# Patient Record
Sex: FEMALE | HISPANIC OR LATINO | Employment: FULL TIME | URBAN - METROPOLITAN AREA
[De-identification: names, ages, dates, MRNs, and addresses within clinical notes are randomized per-mention and may not be internally consistent; named-entity substitution may affect disease eponyms.]

---

## 2017-01-05 ENCOUNTER — OFFICE VISIT (OUTPATIENT)
Dept: FAMILY MEDICINE | Facility: CLINIC | Age: 32
End: 2017-01-05
Payer: COMMERCIAL

## 2017-01-05 VITALS
BODY MASS INDEX: 22.49 KG/M2 | SYSTOLIC BLOOD PRESSURE: 100 MMHG | HEIGHT: 65 IN | TEMPERATURE: 98.1 F | WEIGHT: 135 LBS | DIASTOLIC BLOOD PRESSURE: 64 MMHG | HEART RATE: 70 BPM

## 2017-01-05 DIAGNOSIS — Z80.51 FAMILY HISTORY OF RENAL CELL CARCINOMA: ICD-10-CM

## 2017-01-05 DIAGNOSIS — Z78.9 IMMUNE TO VARICELLA: Primary | ICD-10-CM

## 2017-01-05 PROCEDURE — 86787 VARICELLA-ZOSTER ANTIBODY: CPT | Performed by: FAMILY MEDICINE

## 2017-01-05 PROCEDURE — 99000 SPECIMEN HANDLING OFFICE-LAB: CPT | Performed by: FAMILY MEDICINE

## 2017-01-05 PROCEDURE — 99202 OFFICE O/P NEW SF 15 MIN: CPT | Mod: 25 | Performed by: FAMILY MEDICINE

## 2017-01-05 PROCEDURE — 36415 COLL VENOUS BLD VENIPUNCTURE: CPT | Performed by: FAMILY MEDICINE

## 2017-01-05 NOTE — MR AVS SNAPSHOT
After Visit Summary   1/5/2017    Shellie Camarillo    MRN: [id number]           Patient Information     Date Of Birth          1985        Visit Information        Provider Department      1/5/2017 9:20 AM Andrew Ashley MD [facility]        Today's Diagnoses     Immune to varicella    -  1     Family history of renal cell carcinoma            Follow-ups after your visit        Your next 10 appointments already scheduled     Jan 06, 2017  3:15 PM   Nurse Only with EC NURSE   [facility] ([address] [address]   [phone]            Jan 09, 2017  9:00 AM   Nurse Only with EC NURSE   [facility] ([facility])    [address] [place] [address]   [phone]              Who to contact     If you have questions or need follow up information about today's clinic visit or your schedule please contact [facility] directly at [phone].  Normal or non-critical lab and imaging results will be communicated to you by [facility], letter or phone within 4 business days after the clinic has received the results. If you do not hear from us within 7 days, please contact the clinic through [employer] or phone. If you have a critical or abnormal lab result, we will notify you by phone as soon as possible.  Submit refill requests through [employer] or call your pharmacy and they will forward the refill request to us. Please allow 3 business days for your refill to be completed.          Additional Information About Your Visit        [facility] Information     [employer] gives you secure access to your electronic health record. If you see a primary care provider, you can also send messages to your care team and make appointments. If you have questions, please call your primary care clinic.  If you do not have a primary care provider, please call  "[phone] and they will assist you.        Care EveryWhere ID     This is your Care EveryWhere ID. This could be used by other organizations to access your [facility] medical records  [id number]        Your Vitals Were     Pulse Temperature Height BMI (Body Mass Index)          70 98.1  F (36.7  C) (Tympanic) 5' 5\" (1.651 m) 22.47 kg/m2         Blood Pressure from Last 3 Encounters:   01/05/17 100/64   07/15/15 108/70   07/14/15 114/80    Weight from Last 3 Encounters:   01/05/17 135 lb (61.236 kg)   07/15/15 126 lb (57.153 kg)   07/14/15 126 lb (57.153 kg)              We Performed the Following     Varicella Zoster Virus Antibody IgG        Primary Care Provider Office Phone # Fax #    Nate Shah [phone]       [facility]  [address] [place] [address]        Thank you!     Thank you for choosing [facility]  for your care. Our goal is always to provide you with excellent care. Hearing back from our patients is one way we can continue to improve our services. Please take a few minutes to complete the written survey that you may receive in the mail after your visit with us. Thank you!             Your Updated Medication List - Protect others around you: Learn how to safely use, store and throw away your medicines at www.disposemymeds.org.      Notice  As of 1/5/2017  9:26 AM    You have not been prescribed any medications.      "

## 2017-01-05 NOTE — PROGRESS NOTES
"  SUBJECTIVE:                                                    Shellie Camarillo is a 31 year old female who presents to clinic today for the following health issues:      New Patient/Transfer of Care      Problem list and histories reviewed & adjusted, as indicated.  Additional history: as documented    Patient Active Problem List   Diagnosis     CARDIOVASCULAR SCREENING; LDL GOAL LESS THAN 160     Encounter for Mirena IUD insertion 7/2015     Family history of renal cell carcinoma     Past Surgical History   Procedure Laterality Date     C nonspecific procedure  08/26/2005     Oral surgery     Cosmetic mammoplasty augmentation  10/2011       Social History   Substance Use Topics     Smoking status: Never Smoker      Smokeless tobacco: Never Used     Alcohol Use: Yes      Comment: socially     Family History   Problem Relation Age of Onset     DIABETES Paternal Grandmother      Family History Negative Mother      Family History Negative Father          No current outpatient prescriptions on file.     Allergies   Allergen Reactions     Penicillins      Promethazine      extra-pyramidal sx's     Recent Labs   Lab Test  10/18/11   1337   CR  0.74   GFRESTIMATED  >90   GFRESTBLACK  >90   POTASSIUM  4.0      BP Readings from Last 3 Encounters:   01/05/17 100/64   07/15/15 108/70   07/14/15 114/80    Wt Readings from Last 3 Encounters:   01/05/17 135 lb (61.236 kg)   07/15/15 126 lb (57.153 kg)   07/14/15 126 lb (57.153 kg)                    ROS:  Constitutional, HEENT, cardiovascular, pulmonary, gi and gu systems are negative, except as otherwise noted.    OBJECTIVE:                                                    /64 mmHg  Pulse 70  Temp(Src) 98.1  F (36.7  C) (Tympanic)  Ht 5' 5\" (1.651 m)  Wt 135 lb (61.236 kg)  BMI 22.47 kg/m2  Body mass index is 22.47 kg/(m^2).  GENERAL: healthy, alert and no distress  NECK: no adenopathy, no asymmetry, masses, or scars and thyroid normal to palpation  RESP: lungs " clear to auscultation - no rales, rhonchi or wheezes  CV: regular rate and rhythm, normal S1 S2, no S3 or S4, no murmur, click or rub, no peripheral edema and peripheral pulses strong  ABDOMEN: soft, nontender, no hepatosplenomegaly, no masses and bowel sounds normal  MS: no gross musculoskeletal defects noted, no edema         ASSESSMENT/PLAN:                                                    Shellie was seen today for establish care.    Diagnoses and all orders for this visit:    Immune to varicella  -     Varicella Zoster Virus Antibody IgG    Family history of renal cell carcinoma      Need varicella screening test for school, will have her to check lab, and also will have her to do PPD for it as well     Andrew Ashley MD  [facility]

## 2017-01-05 NOTE — NURSING NOTE
"Chief Complaint   Patient presents with     Establish Care     needs vaccine       Initial /64 mmHg  Pulse 70  Temp(Src) 98.1  F (36.7  C) (Tympanic)  Ht 5' 5\" (1.651 m)  Wt 135 lb (61.236 kg)  BMI 22.47 kg/m2 Estimated body mass index is 22.47 kg/(m^2) as calculated from the following:    Height as of this encounter: 5' 5\" (1.651 m).    Weight as of this encounter: 135 lb (61.236 kg).  BP completed using cuff size: seun Shen CMA    "

## 2017-01-06 ENCOUNTER — ALLIED HEALTH/NURSE VISIT (OUTPATIENT)
Dept: NURSING | Facility: CLINIC | Age: 32
End: 2017-01-06
Payer: COMMERCIAL

## 2017-01-06 DIAGNOSIS — Z11.1 SCREENING EXAMINATION FOR PULMONARY TUBERCULOSIS: Primary | ICD-10-CM

## 2017-01-06 LAB — VZV IGG SER QL IA: 2.3 AI (ref 0–0.8)

## 2017-01-06 PROCEDURE — 86580 TB INTRADERMAL TEST: CPT

## 2017-01-09 ENCOUNTER — ALLIED HEALTH/NURSE VISIT (OUTPATIENT)
Dept: NURSING | Facility: CLINIC | Age: 32
End: 2017-01-09
Payer: COMMERCIAL

## 2017-01-09 DIAGNOSIS — Z11.1 SCREENING EXAMINATION FOR PULMONARY TUBERCULOSIS: Primary | ICD-10-CM

## 2017-01-09 LAB
PPDINDURATION: 0 MM (ref 0–5)
PPDREDNESS: 0 MM

## 2017-01-09 PROCEDURE — 99207 ZZC NO CHARGE NURSE ONLY: CPT

## 2017-01-19 ENCOUNTER — OFFICE VISIT (OUTPATIENT)
Dept: FAMILY MEDICINE | Facility: CLINIC | Age: 32
End: 2017-01-19
Payer: COMMERCIAL

## 2017-01-19 VITALS
HEIGHT: 65 IN | SYSTOLIC BLOOD PRESSURE: 100 MMHG | WEIGHT: 136 LBS | OXYGEN SATURATION: 100 % | TEMPERATURE: 99.1 F | HEART RATE: 63 BPM | BODY MASS INDEX: 22.66 KG/M2 | DIASTOLIC BLOOD PRESSURE: 70 MMHG

## 2017-01-19 DIAGNOSIS — H10.021 PINK EYE, RIGHT: ICD-10-CM

## 2017-01-19 DIAGNOSIS — J01.90 ACUTE SINUSITIS WITH SYMPTOMS > 10 DAYS: ICD-10-CM

## 2017-01-19 DIAGNOSIS — R07.0 THROAT PAIN: Primary | ICD-10-CM

## 2017-01-19 LAB
DEPRECATED S PYO AG THROAT QL EIA: NORMAL
MICRO REPORT STATUS: NORMAL
SPECIMEN SOURCE: NORMAL

## 2017-01-19 PROCEDURE — 87880 STREP A ASSAY W/OPTIC: CPT | Performed by: FAMILY MEDICINE

## 2017-01-19 PROCEDURE — 99213 OFFICE O/P EST LOW 20 MIN: CPT | Performed by: FAMILY MEDICINE

## 2017-01-19 PROCEDURE — 87081 CULTURE SCREEN ONLY: CPT | Performed by: FAMILY MEDICINE

## 2017-01-19 RX ORDER — AZITHROMYCIN 250 MG/1
TABLET, FILM COATED ORAL
Qty: 6 TABLET | Refills: 0 | Status: SHIPPED | OUTPATIENT
Start: 2017-01-19 | End: 2018-09-18

## 2017-01-19 RX ORDER — POLYMYXIN B SULFATE AND TRIMETHOPRIM 1; 10000 MG/ML; [USP'U]/ML
1 SOLUTION OPHTHALMIC EVERY 4 HOURS
Qty: 1 BOTTLE | Refills: 0 | Status: SHIPPED | OUTPATIENT
Start: 2017-01-19 | End: 2017-01-26

## 2017-01-19 NOTE — NURSING NOTE
"Chief Complaint   Patient presents with     Pharyngitis     Eye Problem       Initial /70 mmHg  Pulse 63  Temp(Src) 99.1  F (37.3  C) (Tympanic)  Ht 5' 5\" (1.651 m)  Wt 136 lb (61.689 kg)  BMI 22.63 kg/m2  SpO2 100% Estimated body mass index is 22.63 kg/(m^2) as calculated from the following:    Height as of this encounter: 5' 5\" (1.651 m).    Weight as of this encounter: 136 lb (61.689 kg).  BP completed using cuff size: juan pablo Shen CMA    "

## 2017-01-19 NOTE — PROGRESS NOTES
SUBJECTIVE:                                                    Shellie Camarillo is a 31 year old female who presents to clinic today for the following health issues:      Acute Illness   Acute illness concerns: uri  Onset: x 5 days     Fever: YES    Chills/Sweats: no    Headache (location?): YES    Sinus Pressure:YES    Conjunctivitis:  no    Ear Pain: no    Rhinorrhea: YES    Congestion: YES    Sore Throat: YES     Cough: YES    Wheeze: no    Decreased Appetite: no    Nausea: no    Vomiting: no    Diarrhea:  no    Dysuria/Freq.: no    Fatigue/Achiness: YES    Sick/Strep Exposure: no     Therapies Tried and outcome: OTC meds      Problem list and histories reviewed & adjusted, as indicated.  Additional history: as documented    Patient Active Problem List   Diagnosis     CARDIOVASCULAR SCREENING; LDL GOAL LESS THAN 160     Encounter for Mirena IUD insertion 7/2015     Family history of renal cell carcinoma     Past Surgical History   Procedure Laterality Date     C nonspecific procedure  08/26/2005     Oral surgery     Cosmetic mammoplasty augmentation  10/2011       Social History   Substance Use Topics     Smoking status: Never Smoker      Smokeless tobacco: Never Used     Alcohol Use: Yes      Comment: socially     Family History   Problem Relation Age of Onset     DIABETES Paternal Grandmother      Family History Negative Mother      Family History Negative Father          No current outpatient prescriptions on file.     Allergies   Allergen Reactions     Penicillins      Promethazine      extra-pyramidal sx's     Recent Labs   Lab Test  10/18/11   1337   CR  0.74   GFRESTIMATED  >90   GFRESTBLACK  >90   POTASSIUM  4.0      BP Readings from Last 3 Encounters:   01/19/17 100/70   01/05/17 100/64   07/15/15 108/70    Wt Readings from Last 3 Encounters:   01/19/17 136 lb (61.689 kg)   01/05/17 135 lb (61.236 kg)   07/15/15 126 lb (57.153 kg)                    ROS:  Constitutional, HEENT, cardiovascular,  "pulmonary, gi and gu systems are negative, except as otherwise noted.    OBJECTIVE:                                                    /70 mmHg  Pulse 63  Temp(Src) 99.1  F (37.3  C) (Tympanic)  Ht 5' 5\" (1.651 m)  Wt 136 lb (61.689 kg)  BMI 22.63 kg/m2  SpO2 100%  Body mass index is 22.63 kg/(m^2).  GENERAL: healthy, alert and no distress  EYES: conjunctiva/corneas- conjunctival injection OD  HENT: normal cephalic/atraumatic, ear canals and TM's normal, nasal mucosa edematous , rhinorrhea green and purulent postnasal drainage   NECK: no adenopathy, no asymmetry, masses, or scars and thyroid normal to palpation  RESP: lungs clear to auscultation - no rales, rhonchi or wheezes  CV: regular rate and rhythm, normal S1 S2, no S3 or S4, no murmur, click or rub, no peripheral edema and peripheral pulses strong  ABDOMEN: soft, nontender, no hepatosplenomegaly, no masses and bowel sounds normal  MS: no gross musculoskeletal defects noted, no edema         ASSESSMENT/PLAN:                                                    Shellie was seen today for pharyngitis and eye problem.    Diagnoses and all orders for this visit:    Throat pain  -     Strep, Rapid Screen  -     Beta strep group A culture    Pink eye, right  -     trimethoprim-polymyxin b (POLYTRIM) ophthalmic solution; Apply 1 drop to eye every 4 hours for 7 days    Acute sinusitis with symptoms > 10 days  -     azithromycin (ZITHROMAX) 250 MG tablet; Two tablets first day, then one tablet daily for four days.          Andrew Ashley MD  [facility]    "

## 2017-01-21 LAB
BACTERIA SPEC CULT: NORMAL
MICRO REPORT STATUS: NORMAL
SPECIMEN SOURCE: NORMAL

## 2017-01-23 ENCOUNTER — ALLIED HEALTH/NURSE VISIT (OUTPATIENT)
Dept: NURSING | Facility: CLINIC | Age: 32
End: 2017-01-23
Payer: COMMERCIAL

## 2017-01-23 DIAGNOSIS — Z11.1 SCREENING EXAMINATION FOR PULMONARY TUBERCULOSIS: Primary | ICD-10-CM

## 2017-01-23 PROCEDURE — 86580 TB INTRADERMAL TEST: CPT

## 2017-01-23 NOTE — NURSING NOTE
Patient here for nurse only. Needs mantoux for school/clinicals. Second part of 2 step mantoux, plans to return Wed. For reading. GONZALO Booth LPN

## 2017-01-25 ENCOUNTER — ALLIED HEALTH/NURSE VISIT (OUTPATIENT)
Dept: NURSING | Facility: CLINIC | Age: 32
End: 2017-01-25
Payer: COMMERCIAL

## 2017-01-25 DIAGNOSIS — Z11.1 VISIT FOR MANTOUX TEST: Primary | ICD-10-CM

## 2017-01-25 LAB
PPDINDURATION: NORMAL MM (ref 0–5)
PPDREDNESS: NORMAL MM

## 2017-01-25 PROCEDURE — 99207 ZZC NO CHARGE NURSE ONLY: CPT

## 2018-09-18 ENCOUNTER — OFFICE VISIT (OUTPATIENT)
Dept: FAMILY MEDICINE | Facility: CLINIC | Age: 33
End: 2018-09-18
Payer: COMMERCIAL

## 2018-09-18 VITALS
TEMPERATURE: 98.1 F | HEART RATE: 59 BPM | HEIGHT: 65 IN | OXYGEN SATURATION: 99 % | WEIGHT: 147 LBS | DIASTOLIC BLOOD PRESSURE: 64 MMHG | SYSTOLIC BLOOD PRESSURE: 122 MMHG | BODY MASS INDEX: 24.49 KG/M2

## 2018-09-18 DIAGNOSIS — Z80.51 FAMILY HISTORY OF RENAL CELL CARCINOMA: ICD-10-CM

## 2018-09-18 DIAGNOSIS — Z00.00 ROUTINE GENERAL MEDICAL EXAMINATION AT A HEALTH CARE FACILITY: Primary | ICD-10-CM

## 2018-09-18 DIAGNOSIS — R53.82 CHRONIC FATIGUE: ICD-10-CM

## 2018-09-18 LAB
DEPRECATED CALCIDIOL+CALCIFEROL SERPL-MC: 46 UG/L (ref 20–75)
ERYTHROCYTE [DISTWIDTH] IN BLOOD BY AUTOMATED COUNT: 12.1 % (ref 10–15)
HCT VFR BLD AUTO: 37.3 % (ref 35–47)
HGB BLD-MCNC: 12.3 G/DL (ref 11.7–15.7)
MCH RBC QN AUTO: 28.9 PG (ref 26.5–33)
MCHC RBC AUTO-ENTMCNC: 33 G/DL (ref 31.5–36.5)
MCV RBC AUTO: 88 FL (ref 78–100)
PLATELET # BLD AUTO: 216 10E9/L (ref 150–450)
RBC # BLD AUTO: 4.25 10E12/L (ref 3.8–5.2)
WBC # BLD AUTO: 4.1 10E9/L (ref 4–11)

## 2018-09-18 PROCEDURE — 82306 VITAMIN D 25 HYDROXY: CPT | Performed by: FAMILY MEDICINE

## 2018-09-18 PROCEDURE — 80053 COMPREHEN METABOLIC PANEL: CPT | Performed by: FAMILY MEDICINE

## 2018-09-18 PROCEDURE — 36415 COLL VENOUS BLD VENIPUNCTURE: CPT | Performed by: FAMILY MEDICINE

## 2018-09-18 PROCEDURE — 99395 PREV VISIT EST AGE 18-39: CPT | Performed by: FAMILY MEDICINE

## 2018-09-18 PROCEDURE — 84443 ASSAY THYROID STIM HORMONE: CPT | Performed by: FAMILY MEDICINE

## 2018-09-18 PROCEDURE — 99213 OFFICE O/P EST LOW 20 MIN: CPT | Mod: 25 | Performed by: FAMILY MEDICINE

## 2018-09-18 PROCEDURE — 85027 COMPLETE CBC AUTOMATED: CPT | Performed by: FAMILY MEDICINE

## 2018-09-18 RX ORDER — NORGESTIMATE AND ETHINYL ESTRADIOL 0.25-0.035
KIT ORAL
COMMUNITY
Start: 2018-08-29 | End: 2020-11-05

## 2018-09-18 NOTE — PROGRESS NOTES
SUBJECTIVE:   CC: Shellie Nino is an 33 year old woman who presents for preventive health visit.     Healthy Habits:    Do you get at least three servings of calcium containing foods daily (dairy, green leafy vegetables, etc.)? yes    Amount of exercise or daily activities, outside of work: 2 day(s) per week    Problems taking medications regularly No    Medication side effects: No    Have you had an eye exam in the past two years? yes    Do you see a dentist twice per year? yes    Do you have sleep apnea, excessive snoring or daytime drowsiness?no    Patient complains of chronic fatigue.  Symptoms progressively worsening.  Denies any depression or anxiety.  She sleeps well.    Today's PHQ-2 Score:   PHQ-2 ( 1999 Pf[employer]) 9/18/2018 1/19/2017   Q1: Little interest or pleasure in doing things 0 0   Q2: Feeling down, depressed or hopeless 0 0   PHQ-2 Score 0 0       Abuse: Current or Past(Physical, Sexual or Emotional)- No  Do you feel safe in your environment - Yes    Social History   Substance Use Topics     Smoking status: Never Smoker     Smokeless tobacco: Never Used     Alcohol use Yes      Comment: socially     If you drink alcohol do you typically have >3 drinks per day or >7 drinks per week? No                     Reviewed orders with patient.  Reviewed health maintenance and updated orders accordingly - Yes  Labs reviewed in EPIC  BP Readings from Last 3 Encounters:   09/18/18 122/64   01/19/17 100/70   01/05/17 100/64    Wt Readings from Last 3 Encounters:   09/18/18 147 lb (66.7 kg)   01/19/17 136 lb (61.7 kg)   01/05/17 135 lb (61.2 kg)                  Patient Active Problem List   Diagnosis     Family history of renal cell carcinoma     Past Surgical History:   Procedure Laterality Date     C NONSPECIFIC PROCEDURE  08/26/2005    Oral surgery     COSMETIC MAMMOPLASTY AUGMENTATION  10/2011       Social History   Substance Use Topics     Smoking status: Never Smoker     Smokeless tobacco: Never  "Used     Alcohol use Yes      Comment: socially     Family History   Problem Relation Age of Onset     Kidney Cancer Mother      Hypertension Father      Diabetes Paternal Grandmother          Current Outpatient Prescriptions   Medication Sig Dispense Refill     norgestimate-ethinyl estradiol (ORTHO-CYCLEN, SPRINTEC) 0.25-35 MG-MCG per tablet        Allergies   Allergen Reactions     Penicillins      Promethazine      extra-pyramidal sx's       Mammogram not appropriate for this patient based on age.    Pertinent mammograms are reviewed under the imaging tab.  History of abnormal Pap smear: NO - age 30- 65 PAP every 3 years recommended  PAP / HPV 8/7/2014 11/17/2011 8/26/2010   PAP NIL NIL NIL     Reviewed and updated as needed this visit by clinical staff  Tobacco  Allergies  Meds  Problems  Med Hx  Surg Hx  Fam Hx  Soc Hx          Reviewed and updated as needed this visit by Provider  Allergies  Meds  Problems            ROS:  CONSTITUTIONAL: NEGATIVE for fever, chills, change in weight  INTEGUMENTARU/SKIN: NEGATIVE for worrisome rashes, moles or lesions  EYES: NEGATIVE for vision changes or irritation  ENT: NEGATIVE for ear, mouth and throat problems  RESP: NEGATIVE for significant cough or SOB  BREAST: NEGATIVE for masses, tenderness or discharge  CV: NEGATIVE for chest pain, palpitations or peripheral edema  GI: NEGATIVE for nausea, abdominal pain, heartburn, or change in bowel habits  : NEGATIVE for unusual urinary or vaginal symptoms. Periods are regular.  MUSCULOSKELETAL: NEGATIVE for significant arthralgias or myalgia  NEURO: NEGATIVE for weakness, dizziness or paresthesias  PSYCHIATRIC: NEGATIVE for changes in mood or affect    OBJECTIVE:   /64  Pulse 59  Temp 98.1  F (36.7  C) (Tympanic)  Ht 5' 5.04\" (1.652 m)  Wt 147 lb (66.7 kg)  SpO2 99%  BMI 24.43 kg/m2  EXAM:  GENERAL: healthy, alert and no distress  EYES: Eyes grossly normal to inspection, PERRL and conjunctivae and sclerae " "normal  HENT: ear canals and TM's normal, nose and mouth without ulcers or lesions  NECK: no adenopathy, no asymmetry, masses, or scars and thyroid normal to palpation  RESP: lungs clear to auscultation - no rales, rhonchi or wheezes  CV: regular rate and rhythm, normal S1 S2, no S3 or S4, no murmur, click or rub, no peripheral edema and peripheral pulses strong  ABDOMEN: soft, nontender, no hepatosplenomegaly, no masses and bowel sounds normal  MS: no gross musculoskeletal defects noted, no edema  SKIN: no suspicious lesions or rashes  NEURO: Normal strength and tone, mentation intact and speech normal  PSYCH: mentation appears normal, affect normal/bright          ASSESSMENT/PLAN:   1. Routine general medical examination at a health care facility    2. Chronic fatigue  Questionable etiology.  Recommended the workup as below.  Recommended to sleep well, stay hydrated.  Eat a nutritious diet and exercise regularly.  - Vitamin D Deficiency  - TSH with free T4 reflex  - CBC with platelets  - Comprehensive metabolic panel    3. Family history of renal cell carcinoma  Recommended to get a urinalysis checked once a year as her mother had renal cancer.  - UA reflex to Microscopic; Future    COUNSELING:   Reviewed preventive health counseling, as reflected in patient instructions       Regular exercise       Healthy diet/nutrition    BP Readings from Last 1 Encounters:   09/18/18 122/64     Estimated body mass index is 24.43 kg/(m^2) as calculated from the following:    Height as of this encounter: 5' 5.04\" (1.652 m).    Weight as of this encounter: 147 lb (66.7 kg).           reports that she has never smoked. She has never used smokeless tobacco.      Counseling Resources:  ATP IV Guidelines  Pooled Cohorts Equation Calculator  Breast Cancer Risk Calculator  FRAX Risk Assessment  ICSI Preventive Guidelines  Dietary Guidelines for Americans, 2010  USDA's MyPlate  ASA Prophylaxis  Lung CA Screening    Nicole Beach, " MD  [facility] [place]

## 2018-09-18 NOTE — MR AVS SNAPSHOT
After Visit Summary   9/18/2018    Shellie Nion    MRN: [id number]           Patient Information     Date Of Birth          1985        Visit Information        Provider Department      9/18/2018 9:00 AM Nicole Beach MD [facility]        Today's Diagnoses     Routine general medical examination at a health care facility    -  1    Chronic fatigue        Family history of renal cell carcinoma          Care Instructions      Preventive Health Recommendations  Female Ages 26 - 39  Yearly exam:   See your health care provider every year in order to    Review health changes.     Discuss preventive care.      Review your medicines if you your doctor has prescribed any.    Until age 30: Get a Pap test every three years (more often if you have had an abnormal result).    After age 30: Talk to your doctor about whether you should have a Pap test every 3 years or have a Pap test with HPV screening every 5 years.   You do not need a Pap test if your uterus was removed (hysterectomy) and you have not had cancer.  You should be tested each year for STDs (sexually transmitted diseases), if you're at risk.   Talk to your provider about how often to have your cholesterol checked.  If you are at risk for diabetes, you should have a diabetes test (fasting glucose).  Shots: Get a flu shot each year. Get a tetanus shot every 10 years.   Nutrition:     Eat at least 5 servings of fruits and vegetables each day.    Eat whole-grain bread, whole-wheat pasta and brown rice instead of white grains and rice.    Get adequate Calcium and Vitamin D.     Lifestyle    Exercise at least 150 minutes a week (30 minutes a day, 5 days of the week). This will help you control your weight and prevent disease.    Limit alcohol to one drink per day.    No smoking.     Wear sunscreen to prevent skin cancer.    See your dentist every six months for an exam and cleaning.            Follow-ups after your visit    "     Follow-up notes from your care team     Return in about 1 year (around 9/18/2019) for Annual Physical Exam.      Who to contact     If you have questions or need follow up information about today's clinic visit or your schedule please contact [facility] [place] directly at [phone].  Normal or non-critical lab and imaging results will be communicated to you by MyChart, letter or phone within 4 business days after the clinic has received the results. If you do not hear from us within 7 days, please contact the clinic through [employer] or phone. If you have a critical or abnormal lab result, we will notify you by phone as soon as possible.  Submit refill requests through [employer] or call your pharmacy and they will forward the refill request to us. Please allow 3 business days for your refill to be completed.          Additional Information About Your Visit        [employer] Information     [employer] gives you secure access to your electronic health record. If you see a primary care provider, you can also send messages to your care team and make appointments. If you have questions, please call your primary care clinic.  If you do not have a primary care provider, please call [phone] and they will assist you.        Care EveryWhere ID     This is your Care EveryWhere ID. This could be used by other organizations to access your [facility] medical records  [id number]        Your Vitals Were     Pulse Temperature Height Pulse Oximetry BMI (Body Mass Index)       59 98.1  F (36.7  C) (Tympanic) 5' 5.04\" (1.652 m) 99% 24.43 kg/m2        Blood Pressure from Last 3 Encounters:   09/18/18 122/64   01/19/17 100/70   01/05/17 100/64    Weight from Last 3 Encounters:   09/18/18 147 lb (66.7 kg)   01/19/17 136 lb (61.7 kg)   01/05/17 135 lb (61.2 kg)              We Performed the Following     CBC with platelets     Comprehensive metabolic panel     TSH with free T4 reflex     UA reflex to Microscopic     Vitamin " D Deficiency        Primary Care Provider Office Phone # Fax #    Andrew IDANIA Ashley [phone]        [facility] [address]        Equal Access to Services     CONNOR BENDER : Hadii aad ku hadstanfordo Soomaali, waaxda luqadaha, qaybta kaalmada adeegyada, april lawosnin hayaashannan bouchertito turcios princess adam. So [facility] [phone].    ATENCIÓN: Si habla español, tiene a stafford disposición servicios gratuitos de asistencia lingüística. Llame al [phone].    We comply with applicable federal civil rights laws and [place] laws. We do not discriminate on the basis of race, color, national origin, age, disability, sex, sexual orientation, or gender identity.            Thank you!     Thank you for choosing [facility]  for your care. Our goal is always to provide you with excellent care. Hearing back from our patients is one way we can continue to improve our services. Please take a few minutes to complete the written survey that you may receive in the mail after your visit with us. Thank you!             Your Updated Medication List - Protect others around you: Learn how to safely use, store and throw away your medicines at www.disposemymeds.org.          This list is accurate as of 9/18/18  9:16 AM.  Always use your most recent med list.                   Brand Name Dispense Instructions for use Diagnosis    norgestimate-ethinyl estradiol 0.25-35 MG-MCG per tablet    ORTHO-CYCLEN, SPRINTEC

## 2018-09-19 LAB
ALBUMIN SERPL-MCNC: 3.6 G/DL (ref 3.4–5)
ALP SERPL-CCNC: 54 U/L (ref 40–150)
ALT SERPL W P-5'-P-CCNC: 18 U/L (ref 0–50)
ANION GAP SERPL CALCULATED.3IONS-SCNC: 8 MMOL/L (ref 3–14)
AST SERPL W P-5'-P-CCNC: 17 U/L (ref 0–45)
BILIRUB SERPL-MCNC: 0.4 MG/DL (ref 0.2–1.3)
BUN SERPL-MCNC: 7 MG/DL (ref 7–30)
CALCIUM SERPL-MCNC: 8.8 MG/DL (ref 8.5–10.1)
CHLORIDE SERPL-SCNC: 107 MMOL/L (ref 94–109)
CO2 SERPL-SCNC: 28 MMOL/L (ref 20–32)
CREAT SERPL-MCNC: 0.67 MG/DL (ref 0.52–1.04)
GFR SERPL CREATININE-BSD FRML MDRD: >90 ML/MIN/1.7M2
GLUCOSE SERPL-MCNC: 78 MG/DL (ref 70–99)
POTASSIUM SERPL-SCNC: 4.1 MMOL/L (ref 3.4–5.3)
PROT SERPL-MCNC: 7.2 G/DL (ref 6.8–8.8)
SODIUM SERPL-SCNC: 143 MMOL/L (ref 133–144)
TSH SERPL DL<=0.005 MIU/L-ACNC: 1.15 MU/L (ref 0.4–4)

## 2019-01-01 ENCOUNTER — TRANSFERRED RECORDS (OUTPATIENT)
Dept: HEALTH INFORMATION MANAGEMENT | Facility: CLINIC | Age: 34
End: 2019-01-01

## 2019-01-01 LAB — PAP SMEAR - HIM PATIENT REPORTED: NEGATIVE

## 2020-02-17 ENCOUNTER — HEALTH MAINTENANCE LETTER (OUTPATIENT)
Age: 35
End: 2020-02-17

## 2020-06-08 ENCOUNTER — MYC MEDICAL ADVICE (OUTPATIENT)
Dept: FAMILY MEDICINE | Facility: CLINIC | Age: 35
End: 2020-06-08

## 2020-06-08 ENCOUNTER — VIRTUAL VISIT (OUTPATIENT)
Dept: FAMILY MEDICINE | Facility: CLINIC | Age: 35
End: 2020-06-08
Payer: COMMERCIAL

## 2020-06-08 DIAGNOSIS — Z20.822 COVID-19 RULED OUT: Primary | ICD-10-CM

## 2020-06-08 PROCEDURE — 99213 OFFICE O/P EST LOW 20 MIN: CPT | Mod: TEL | Performed by: FAMILY MEDICINE

## 2020-06-08 NOTE — Clinical Note
Can you please call her to find her email address? I wanted to send email with link page about [facility] info, but her email address I got earlier today not working...  thx

## 2020-06-08 NOTE — PROGRESS NOTES
"Shellie Nino is a 34 year old female who is being evaluated via a billable telephone visit.      The patient has been notified of following:     \"This telephone visit will be conducted via a call between you and your physician/provider. We have found that certain health care needs can be provided without the need for a physical exam.  This service lets us provide the care you need with a short phone conversation.  If a prescription is necessary we can send it directly to your pharmacy.  If lab work is needed we can place an order for that and you can then stop by our lab to have the test done at a later time.    Telephone visits are billed at different rates depending on your insurance coverage. During this emergency period, for some insurers they may be billed the same as an in-person visit.  Please reach out to your insurance provider with any questions.    If during the course of the call the physician/provider feels a telephone visit is not appropriate, you will not be charged for this service.\"    Patient has given verbal consent for Telephone visit?  Yes    What phone number would you like to be contacted at? [phone]    How would you like to obtain your AVS? 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  She took part at the protest this past week           Patient Active Problem List   Diagnosis     Family history of renal cell carcinoma     Past Surgical History:   Procedure Laterality Date     C NONSPECIFIC PROCEDURE  08/26/2005    Oral surgery     COSMETIC MAMMOPLASTY AUGMENTATION  10/2011       Social History     Tobacco Use     Smoking status: Never Smoker     Smokeless tobacco: Never Used   Substance Use Topics     Alcohol use: Yes     Comment: socially     " Family History   Problem Relation Age of Onset     Kidney Cancer Mother      Hypertension Father      Diabetes Paternal Grandmother          Current Outpatient Medications   Medication Sig Dispense Refill     norgestimate-ethinyl estradiol (ORTHO-CYCLEN, SPRINTEC) 0.25-35 MG-MCG per tablet        Allergies   Allergen Reactions     Penicillins      Promethazine      extra-pyramidal sx's     Recent Labs   Lab Test 09/18/18  0922   ALT 18   CR 0.67   GFRESTIMATED >90   GFRESTBLACK >90   POTASSIUM 4.1   TSH 1.15      BP Readings from Last 3 Encounters:   09/18/18 122/64   01/19/17 100/70   01/05/17 100/64    Wt Readings from Last 3 Encounters:   09/18/18 66.7 kg (147 lb)   01/19/17 61.7 kg (136 lb)   01/05/17 61.2 kg (135 lb)                    Reviewed and updated as needed this visit by Provider         Review of Systems   Constitutional, HEENT, cardiovascular, pulmonary, gi and gu systems are negative, except as otherwise noted.       Objective   Reported vitals:  There were no vitals taken for this visit.   healthy, alert and no distress  PSYCH: Alert and oriented times 3; coherent speech, normal   rate and volume, able to articulate logical thoughts, able   to abstract reason, no tangential thoughts, no hallucinations   or delusions  Her affect is normal  RESP: No cough, no audible wheezing, able to talk in full sentences  Remainder of exam unable to be completed due to telephone visits    Diagnostic Test Results:  Labs reviewed in Epic        Assessment/Plan:  ASSESSMENT / PLAN:  (Z03.818) COVID-19 ruled out  (primary encounter diagnosis)  Comment: she is concerning about exposure to COVID-19 during mass gathering related with weekend protests/city clean-up  She currently has no sx, but 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      Phone call duration:  11 minutes    Andrew Ashley MD

## 2020-06-08 NOTE — TELEPHONE ENCOUNTER
I think she did not receive the message with [facility] link page I mentioned earlier today. The email I sent was bounced back and marked as 'not delivered'.  Please send her [employer] message as below          Dear Shellie,   Here I attached the link page for the info.   [email or web address]  I hope this helps. Please stay healthy, and thank you for your yessy volunteering~!     Andrew Ashley MD   [facility]

## 2020-10-13 ENCOUNTER — MYC MEDICAL ADVICE (OUTPATIENT)
Dept: FAMILY MEDICINE | Facility: CLINIC | Age: 35
End: 2020-10-13

## 2020-10-13 NOTE — TELEPHONE ENCOUNTER
Patient returned call to clinic.  Patient states she was exposed to a family member on Friday 10/9/2020.  The positive person had a cough and the family thought it was allergies, the family member came back positive today 10/13/2020.    No symptoms. Patient does work in healthcare and is wondering at what point she needs to be tested?  Reviewed CDC guidelines for quarantining since family member was positive.    Routing to provider for review and advise as appropriate.    KEVIN HahnN, RN  [employer] [facility]

## 2020-10-13 NOTE — TELEPHONE ENCOUNTER
Left non-detailed message to call the clinic back at [phone] and ask to speak with a  triage nurse. Pattie Rosas RN

## 2020-11-05 ENCOUNTER — OFFICE VISIT (OUTPATIENT)
Dept: FAMILY MEDICINE | Facility: CLINIC | Age: 35
End: 2020-11-05
Payer: COMMERCIAL

## 2020-11-05 VITALS
DIASTOLIC BLOOD PRESSURE: 62 MMHG | HEIGHT: 65 IN | HEART RATE: 79 BPM | TEMPERATURE: 98.4 F | SYSTOLIC BLOOD PRESSURE: 98 MMHG | BODY MASS INDEX: 27.16 KG/M2 | WEIGHT: 163 LBS | OXYGEN SATURATION: 98 %

## 2020-11-05 DIAGNOSIS — N92.6 MISSED PERIOD: ICD-10-CM

## 2020-11-05 DIAGNOSIS — Z00.00 ROUTINE GENERAL MEDICAL EXAMINATION AT A HEALTH CARE FACILITY: Primary | ICD-10-CM

## 2020-11-05 DIAGNOSIS — Z01.84 IMMUNITY STATUS TESTING: ICD-10-CM

## 2020-11-05 LAB
HCG SERPL QL: POSITIVE
HCG UR QL: NEGATIVE

## 2020-11-05 PROCEDURE — 84703 CHORIONIC GONADOTROPIN ASSAY: CPT | Performed by: PHYSICIAN ASSISTANT

## 2020-11-05 PROCEDURE — 81025 URINE PREGNANCY TEST: CPT | Performed by: PHYSICIAN ASSISTANT

## 2020-11-05 PROCEDURE — 99213 OFFICE O/P EST LOW 20 MIN: CPT | Mod: 25 | Performed by: PHYSICIAN ASSISTANT

## 2020-11-05 PROCEDURE — 36415 COLL VENOUS BLD VENIPUNCTURE: CPT | Performed by: PHYSICIAN ASSISTANT

## 2020-11-05 PROCEDURE — 99395 PREV VISIT EST AGE 18-39: CPT | Performed by: PHYSICIAN ASSISTANT

## 2020-11-05 ASSESSMENT — MIFFLIN-ST. JEOR: SCORE: 1425.24

## 2020-11-05 NOTE — Clinical Note
Please abstract the following data from this visit with this patient into the appropriate field in Epic:    Tests that can be patient reported without a hard copy:    Pap smear done on this date: 1/ 2019 (approximately), by this group: [facility]}, results were normal

## 2020-11-05 NOTE — PROGRESS NOTES
SUBJECTIVE:   CC: Shellie Nino is an 35 year old woman who presents for preventive health visit.     Patient has been advised of split billing requirements and indicates understanding: Yes    Answers for HPI/ROS submitted by the patient on 11/5/2020   Annual Exam:  Frequency of exercise:: 2-3 days/week  Getting at least 3 servings of Calcium per day:: Yes  Diet:: Regular (no restrictions)  Taking medications regularly:: Yes  Medication side effects:: None  Bi-annual eye exam:: Yes  Dental care twice a year:: Yes  Sleep apnea or symptoms of sleep apnea:: None  Additional concerns today:: No  Duration of exercise:: 15-30 minutes      Today's PHQ-2 Score:   PHQ-2 ( 1999 Pfizer) 11/5/2020 9/18/2018   Q1: Little interest or pleasure in doing things 0 0   Q2: Feeling down, depressed or hopeless 0 0   PHQ-2 Score 0 0   Q1: Little interest or pleasure in doing things Not at all -   Q2: Feeling down, depressed or hopeless Not at all -   PHQ-2 Score 0 -         LMP: 10/5/2020, cycles usually 24 days.  She is trying to become pregnant.  Took an at home pregnancy test and this was positive but the line was very faint.  Would like to recheck this today.       She is unsure if she has had chicken pox in the past. Would like titers drawn.      Please abstract the following data from this visit with this patient into the appropriate field in Epic:    Tests that can be patient reported without a hard copy:    Pap smear done on this date: 1/ 2019 (approximately), by this group: [facility]}, results were normal            Abuse: Current or Past(Physical, Sexual or Emotional)- No  Do you feel safe in your environment? Yes        Social History     Tobacco Use     Smoking status: Never Smoker     Smokeless tobacco: Never Used   Substance Use Topics     Alcohol use: Yes     Comment: socially     If you drink alcohol do you typically have >3 drinks per day or >7 drinks per week? No                     Reviewed orders with  patient.  Reviewed health maintenance and updated orders accordingly - Yes  Patient Active Problem List   Diagnosis     Family history of renal cell carcinoma     Past Surgical History:   Procedure Laterality Date     COSMETIC MAMMOPLASTY AUGMENTATION  10/2011     Z NONSPECIFIC PROCEDURE  2005    Oral surgery       Social History     Tobacco Use     Smoking status: Never Smoker     Smokeless tobacco: Never Used   Substance Use Topics     Alcohol use: Not Currently     Comment: socially     Family History   Problem Relation Age of Onset     Kidney Cancer Mother      Other Cancer Mother         RCC     Hypertension Father      Diabetes Paternal Grandmother          No current outpatient medications on file.     Allergies   Allergen Reactions     Penicillins      Promethazine      extra-pyramidal sx's     Recent Labs   Lab Test 18  0922   ALT 18   CR 0.67   GFRESTIMATED >90   GFRESTBLACK >90   POTASSIUM 4.1   TSH 1.15        Mammogram not appropriate for this patient based on age.    Pertinent mammograms are reviewed under the imaging tab.  History of abnormal Pap smear: NO - age 30-65 PAP every 5 years with negative HPV co-testing recommended  PAP / HPV 2011   PAP NIL NIL NIL     Reviewed and updated as needed this visit by clinical staff                 Reviewed and updated as needed this visit by Provider                Past Medical History:   Diagnosis Date     Family history of renal cell carcinoma 2017     Uterine inversion     first pregnancy      Past Surgical History:   Procedure Laterality Date     COSMETIC MAMMOPLASTY AUGMENTATION  10/2011     [facility] NONSPECIFIC PROCEDURE  2005    Oral surgery     OB History    Para Term  AB Living   3 2 2 0 1 2   SAB TAB Ectopic Multiple Live Births   1 0 0 0 2      # Outcome Date GA Lbr Bert/2nd Weight Sex Delivery Anes PTL Lv   3 SAB 07/08/15 4w0d          2 Term 08 40w0d 12:00 3.459 kg (7 lb 10 oz) F IVD  EPI  INES      Birth Comments: manual removal of placenta      Name: Kaylee Ac Term 07/23/06 40w0d 14:00 3.459 kg (7 lb 10 oz) F IVD EPIDURAL  INES      Birth Comments: uterine inversion      Name: Feroz      Obstetric Comments   Pregnancy with IUD in utero.  IUD removed and pregnancy resolved       ROS:  CONSTITUTIONAL: NEGATIVE for fever, chills, change in weight  INTEGUMENTARU/SKIN: NEGATIVE for worrisome rashes, moles or lesions  EYES: NEGATIVE for vision changes or irritation  ENT: NEGATIVE for ear, mouth and throat problems  RESP: NEGATIVE for significant cough or SOB  BREAST: NEGATIVE for masses, tenderness or discharge  CV: NEGATIVE for chest pain, palpitations or peripheral edema  GI: NEGATIVE for nausea, abdominal pain, heartburn, or change in bowel habits  : NEGATIVE for unusual urinary or vaginal symptoms. Periods are regular.  MUSCULOSKELETAL: NEGATIVE for significant arthralgias or myalgia  NEURO: NEGATIVE for weakness, dizziness or paresthesias  PSYCHIATRIC: NEGATIVE for changes in mood or affect    OBJECTIVE:   There were no vitals taken for this visit.  EXAM:  GENERAL: healthy, alert and no distress  EYES: Eyes grossly normal to inspection, PERRL and conjunctivae and sclerae normal  HENT: ear canals and TM's normal, nose and mouth without ulcers or lesions  NECK: no adenopathy, no asymmetry, masses, or scars and thyroid normal to palpation  RESP: lungs clear to auscultation - no rales, rhonchi or wheezes  CV: regular rate and rhythm, normal S1 S2, no S3 or S4, no murmur, click or rub, no peripheral edema and peripheral pulses strong  ABDOMEN: soft, nontender, no hepatosplenomegaly, no masses and bowel sounds normal  MS: no gross musculoskeletal defects noted, no edema  SKIN: no suspicious lesions or rashes  NEURO: Normal strength and tone, mentation intact and speech normal  PSYCH: mentation appears normal, affect normal/bright    Diagnostic Test Results:  Labs reviewed in Epic  none  "    ASSESSMENT/PLAN:   1. Routine general medical examination at a health care facility  - Lipid Profile (Chol, Trig, HDL, LDL calc); Future    2. Missed period  Upreg negative today, patient would like serum testing done.  Advised that if still negative to repeat home testing in 2-3 more days if she has not yet had her period.    I will reach out to her with these results.   - HCG Qual, Urine ([id number])  - HCG qualitative, Blood ([id number])    3. Immunity status testing  - Varicella Zoster Virus Antibody IgG; Future    COUNSELING:   Reviewed preventive health counseling, as reflected in patient instructions       Regular exercise       Healthy diet/nutrition    Estimated body mass index is 24.43 kg/m  as calculated from the following:    Height as of 9/18/18: 1.652 m (5' 5.04\").    Weight as of 9/18/18: 66.7 kg (147 lb).        She reports that she has never smoked. She has never used smokeless tobacco.      Counseling Resources:  ATP IV Guidelines  Pooled Cohorts Equation Calculator  Breast Cancer Risk Calculator  BRCA-Related Cancer Risk Assessment: FHS-7 Tool  FRAX Risk Assessment  ICSI Preventive Guidelines  Dietary Guidelines for Americans, 2010  USDA's MyPlate  ASA Prophylaxis  Lung CA Screening    JEAN CARLOS [facility]  "

## 2020-11-18 NOTE — PROGRESS NOTES
[facility] - PRIMARY CARE SKIN    CC: Lesion(s)  SUBJECTIVE:   Shellie Nino is a(n) 35 year old female who presents to clinic today for acne.     Issue One: Location face. BCP was effective for control but now trying to conceive so she stopped the BCP and now has more acne lesions on the lower face. Previous Mirena , made her acne worse.         Personal Medical History  Skin cancer: NO  Eczema Psoriasis Lupus   NO NO NO   Other:   .    Family Medical History  Skin cancer: NO  Eczema Psoriasis Lupus   NO NO NO   Other:   .      Occupation: ultrasound tech ().    Refer to electronic medical record (EMR) for past medical history and medications.        ROS: 14 point review of systems was negative except the symptoms listed above in the HPI.          OBJECTIVE:   GENERAL: healthy, alert and no distress.  HEENT: PERRL. Conjunctiva, sclera clear.  SKIN: Gonzalez Skin Type - IV.  Face examined. The dermatoscope was used to help evaluate pigmented lesions.  Skin Pertinent Findings:  Lower face- multiple nodules and inflammatory papules various stages of resolution     ASSESSMENT:     Encounter Diagnosis   Name Primary?     Acne vulgaris Yes     MDM: discussed safe treatments for acne when trying to conceive or pregnant.    PLAN:   Patient Instructions   AM/ PM     Clindamycin 1 % lotion apply to face two times per day    AVoid :      Tretinoin, retin a , adapalene ( Differin )           TT:   20 minutes.

## 2020-11-19 ENCOUNTER — OFFICE VISIT (OUTPATIENT)
Dept: FAMILY MEDICINE | Facility: CLINIC | Age: 35
End: 2020-11-19
Payer: COMMERCIAL

## 2020-11-19 VITALS — DIASTOLIC BLOOD PRESSURE: 60 MMHG | SYSTOLIC BLOOD PRESSURE: 118 MMHG

## 2020-11-19 DIAGNOSIS — L70.0 ACNE VULGARIS: Primary | ICD-10-CM

## 2020-11-19 PROCEDURE — 99213 OFFICE O/P EST LOW 20 MIN: CPT | Performed by: FAMILY MEDICINE

## 2020-11-19 RX ORDER — CLINDAMYCIN PHOSPHATE 10 UG/ML
LOTION TOPICAL 2 TIMES DAILY
Qty: 60 ML | Refills: 3 | Status: SHIPPED | OUTPATIENT
Start: 2020-11-19 | End: 2022-02-18

## 2020-11-19 NOTE — LETTER
11/19/2020         RE: Shellie Nino  [id number]  [place] [address]        Dear Colleague,    Thank you for referring your patient, Shellie Nino, to the [facility]. Please see a copy of my visit note below.    [facility] - PRIMARY CARE SKIN    CC: Lesion(s)  SUBJECTIVE:   Shellie Nino is a(n) 35 year old female who presents to clinic today for acne.     Issue One: Location face. BCP was effective for control but now trying to conceive so she stopped the BCP and now has more acne lesions on the lower face. Previous Mirena , made her acne worse.         Personal Medical History  Skin cancer: NO  Eczema Psoriasis Lupus   NO NO NO   Other:   .    Family Medical History  Skin cancer: NO  Eczema Psoriasis Lupus   NO NO NO   Other:   .      Occupation: ultrasound tech ().    Refer to electronic medical record (EMR) for past medical history and medications.        ROS: 14 point review of systems was negative except the symptoms listed above in the HPI.          OBJECTIVE:   GENERAL: healthy, alert and no distress.  HEENT: PERRL. Conjunctiva, sclera clear.  SKIN: Gonzalez Skin Type - IV.  Face examined. The dermatoscope was used to help evaluate pigmented lesions.  Skin Pertinent Findings:  Lower face- multiple nodules and inflammatory papules various stages of resolution     ASSESSMENT:     Encounter Diagnosis   Name Primary?     Acne vulgaris Yes     MDM: discussed safe treatments for acne when trying to conceive or pregnant.    PLAN:   Patient Instructions   AM/ PM     Clindamycin 1 % lotion apply to face two times per day    AVoid :      Tretinoin, retin a , adapalene ( Differin )           TT:   20 minutes.        Again, thank you for allowing me to participate in the care of your patient.        Sincerely,        Lauren Narvaez MD

## 2020-11-19 NOTE — PATIENT INSTRUCTIONS
AM/ PM     Clindamycin 1 % lotion apply to face two times per day    AVoid :      Tretinoin, retin a , adapalene ( Differin )

## 2021-02-01 ENCOUNTER — OFFICE VISIT (OUTPATIENT)
Dept: FAMILY MEDICINE | Facility: CLINIC | Age: 36
End: 2021-02-01
Payer: COMMERCIAL

## 2021-02-01 VITALS
OXYGEN SATURATION: 99 % | DIASTOLIC BLOOD PRESSURE: 84 MMHG | HEART RATE: 67 BPM | TEMPERATURE: 98 F | SYSTOLIC BLOOD PRESSURE: 134 MMHG

## 2021-02-01 DIAGNOSIS — Z00.00 WELL ADULT EXAM: Primary | ICD-10-CM

## 2021-02-01 DIAGNOSIS — Z13.6 SCREENING FOR HEART DISEASE: ICD-10-CM

## 2021-02-01 DIAGNOSIS — Z23 NEED FOR TDAP VACCINATION: ICD-10-CM

## 2021-02-01 DIAGNOSIS — Z30.41 ENCOUNTER FOR SURVEILLANCE OF CONTRACEPTIVE PILLS: ICD-10-CM

## 2021-02-01 DIAGNOSIS — Z13.1 SCREENING FOR DIABETES MELLITUS: ICD-10-CM

## 2021-02-01 PROCEDURE — 90715 TDAP VACCINE 7 YRS/> IM: CPT | Performed by: NURSE PRACTITIONER

## 2021-02-01 PROCEDURE — 99395 PREV VISIT EST AGE 18-39: CPT | Mod: 25 | Performed by: NURSE PRACTITIONER

## 2021-02-01 PROCEDURE — 90471 IMMUNIZATION ADMIN: CPT | Performed by: NURSE PRACTITIONER

## 2021-02-01 RX ORDER — NORGESTIMATE AND ETHINYL ESTRADIOL 7DAYSX3 28
1 KIT ORAL DAILY
COMMUNITY
End: 2021-02-01

## 2021-02-01 RX ORDER — NORGESTIMATE AND ETHINYL ESTRADIOL 7DAYSX3 28
1 KIT ORAL DAILY
Qty: 84 TABLET | Refills: 3 | Status: SHIPPED | OUTPATIENT
Start: 2021-02-01 | End: 2022-02-18

## 2021-02-01 ASSESSMENT — ANXIETY QUESTIONNAIRES
5. BEING SO RESTLESS THAT IT IS HARD TO SIT STILL: NOT AT ALL
7. FEELING AFRAID AS IF SOMETHING AWFUL MIGHT HAPPEN: NOT AT ALL
GAD7 TOTAL SCORE: 3
6. BECOMING EASILY ANNOYED OR IRRITABLE: NOT AT ALL
1. FEELING NERVOUS, ANXIOUS, OR ON EDGE: SEVERAL DAYS
GAD7 TOTAL SCORE: 3
7. FEELING AFRAID AS IF SOMETHING AWFUL MIGHT HAPPEN: NOT AT ALL
4. TROUBLE RELAXING: NOT AT ALL
2. NOT BEING ABLE TO STOP OR CONTROL WORRYING: SEVERAL DAYS
3. WORRYING TOO MUCH ABOUT DIFFERENT THINGS: SEVERAL DAYS

## 2021-02-01 ASSESSMENT — PAIN SCALES - GENERAL: PAINLEVEL: NO PAIN (0)

## 2021-02-01 NOTE — NURSING NOTE
35 year old  Chief Complaint   Patient presents with     Establish Care     Pt is here to establish care.       Blood pressure 134/84, pulse 67, temperature 98  F (36.7  C), temperature source Oral, SpO2 99 %, not currently breastfeeding. There is no height or weight on file to calculate BMI.  BP completed using cuff size:      Ernestina Coleman, BASIL  February 1, 2021 12:25 PM

## 2021-02-01 NOTE — PATIENT INSTRUCTIONS
[facility] Medication Refill Request Information:  * Please contact your pharmacy regarding ANY request for medication refills.  ** NP Clinic Prescription Fax = [phone]  * Please allow 3 business days for routine medication refills.  * Please allow 5 business days for controlled substance medication refills.     [facility] Test Result notification information:  *You will be notified with in 7-10 days of your appointment day regarding the results of your test.  If you are on MyChart you will be notified as soon as the provider has reviewed the results and signed off on them.    [facility]: [phone]     If you have questions regarding Covid-19 and the Covid-19 vaccine, please visit this website.    [email or web address]

## 2021-02-01 NOTE — PROGRESS NOTES
Female Physical Note          HPI    Concerns today: No special concerns today.    Patient Active Problem List   Diagnosis     Family history of renal cell carcinoma       Past Medical History:   Diagnosis Date     Family history of renal cell carcinoma 1/5/2017     Uterine inversion     first pregnancy       Previous Medical Care: [facility]     Family History   Problem Relation Age of Onset     Kidney Cancer Mother      Other Cancer Mother         RCC     Hypertension Father      Diabetes Paternal Grandmother             Review of Systems:     Review of Systems:  CONSTITUTIONAL: NEGATIVE for fever, chills, change in weight  INTEGUMENTARY/SKIN: NEGATIVE for worrisome rashes, moles or lesions  EYES: NEGATIVE for vision changes or irritation  ENT/MOUTH: NEGATIVE for ear, mouth and throat problems  RESP: NEGATIVE for significant cough or SOB  BREAST: NEGATIVE for masses, tenderness or discharge  CV: NEGATIVE for chest pain, palpitations or peripheral edema  GI: NEGATIVE for nausea, abdominal pain, heartburn, or change in bowel habits  : NEGATIVE for frequency, dysuria, or hematuria  MUSCULOSKELETAL: NEGATIVE for significant arthralgias or myalgia  NEURO: NEGATIVE for weakness, dizziness or paresthesias  ENDOCRINE: NEGATIVE for temperature intolerance, skin/hair changes  HEME/ALLERGY: NEGATIVE for bleeding problems  PSYCHIATRIC: NEGATIVE for changes in mood or affect  Sleep:   Do you snore most or the night (as reported by a family member)? No  Do you feel sleepy or extremely tired during most of the day? No         Social History     Social History     Socioeconomic History     Marital status:      Spouse name: benjamin     Number of children: 2     Years of education: Not on file     Highest education level: Not on file   Occupational History     Occupation: [employer]   Social Needs     Financial resource strain: Not on file     Food insecurity     Worry: Not on file     Inability: Not on file      Transportation needs     Medical: Not on file     Non-medical: Not on file   Tobacco Use     Smoking status: Never Smoker     Smokeless tobacco: Never Used   Substance and Sexual Activity     Alcohol use: Not Currently     Comment: socially     Drug use: No     Sexual activity: Yes     Partners: Male     Birth control/protection: None   Lifestyle     Physical activity     Days per week: Not on file     Minutes per session: Not on file     Stress: Not on file   Relationships     Social connections     Talks on phone: Not on file     Gets together: Not on file     Attends Buddhist service: Not on file     Active member of club or organization: Not on file     Attends meetings of clubs or organizations: Not on file     Relationship status: Not on file     Intimate partner violence     Fear of current or ex partner: Not on file     Emotionally abused: Not on file     Physically abused: Not on file     Forced sexual activity: Not on file   Other Topics Concern      Service Not Asked     Blood Transfusions No     Caffeine Concern Not Asked     Occupational Exposure Not Asked     Hobby Hazards Not Asked     Sleep Concern Not Asked     Stress Concern Not Asked     Weight Concern Not Asked     Special Diet Not Asked     Back Care Not Asked     Exercise Yes     Bike Helmet Not Asked     Seat Belt Yes     Self-Exams No     Parent/sibling w/ CABG, MI or angioplasty before 65F 55M? No   Social History Narrative    Living arrangements - the patient lives with her family.      Marital Status:  Who lives in your household?  and children    Has anyone hurt you physically, for example by pushing, hitting, slapping or kicking you or forcing you to have sex? Denies  Do you feel threatened or controlled by a partner, ex-partner or anyone in your life? Denies    Sexual Health     Sexual concerns: No   STI History: Neg  Pregnancy History:   LMP: 1/3/21  Last Pap Smear Date:  2019 Negative pap with  negative HPV co-testing, next pap due 2024.     Abnormal Pap History: None    Recommended Screening     Cholesterol Level (>44 yo or at risk):  Recommended and patient accepted testing.         Physical Exam:     Vitals: /84   Pulse 67   Temp 98  F (36.7  C) (Oral)   SpO2 99%   BMI=      GENERAL: healthy, alert and no distress  EYES: Eyes grossly normal to inspection, extraocular movements - intact, and PERRL  HENT: ear canals- normal; TMs- normal; Nose- normal; Mouth- no ulcers, no lesions  NECK: no tenderness, no adenopathy, no asymmetry, no masses, no stiffness; thyroid- normal to palpation  RESP: lungs clear to auscultation - no rales, no rhonchi, no wheezes  BREAST: no masses, no tenderness, no nipple discharge, no palpable axillary masses or adenopathy  CV: regular rates and rhythm, normal S1 S2, no S3 or S4 and no murmur, no click or rub -  ABDOMEN: soft, no tenderness, no  hepatosplenomegaly, no masses, normal bowel sounds  MS: extremities- no gross deformities noted, no edema  SKIN: no suspicious lesions, no rashes  NEURO: strength and tone- normal, sensory exam- grossly normal, mentation- intact, speech- normal, reflexes- symmetric  BACK: no CVA tenderness.  PSYCH: Alert and oriented times 3; speech- coherent , normal rate and volume; able to articulate logical thoughts, able to abstract reason, no tangential thoughts, no hallucinations or delusions, affect- normal  LYMPHATICS: ant. cervical- normal, post. cervical- normal, axillary- normal, supraclavicular- normal.    NOHEMY 7 TOTAL SCORE: 3    Assessment and Plan     1. Routine follow up in one year.  2.Contraception: OCP-see med list, refilled.   3.Lipid and glucose fasting to be drawn 02/02/2023    Options for treatment and follow-up care were reviewed with the patient . Shellie Camarillo Nino and/or guardian engaged in the decision making process and verbalized understanding of the options discussed and agreed with the final plan.    Kareen SWEET  DEION Shane. NP Student, [facility].   I was present with the NP student who participated in the service and in the documentation of the services provided. I have verified the history and personally performed the physical exam and medical decision making, as documented by the student and edited by me.  STEVIE Overton, CNP

## 2021-02-02 ASSESSMENT — ANXIETY QUESTIONNAIRES: GAD7 TOTAL SCORE: 3

## 2021-03-15 NOTE — PROGRESS NOTES
[facility] - PRIMARY CARE SKIN    CC: Lesion(s)  SUBJECTIVE:   Shellie Nino is a(n) 35 year old female who presents to clinic today for follow up of her acne.     Issue One:   Current treatment : BCP ( restarted in December ) , retinoid 0.05% cream at night , clindamycin lotion two times per week   Previous treatments : BCP was effective for control but now trying to conceive so she stopped the BCP and now has more acne lesions on the lower face. Previous Mirena , made her acne worse.         Personal Medical History  Skin cancer: NO  Eczema Psoriasis Lupus   NO NO NO   Other:   .    Family Medical History  Skin cancer: NO  Eczema Psoriasis Lupus   NO NO NO   Other:   .      Occupation: ultrasound tech ().    Refer to electronic medical record (EMR) for past medical history and medications.        ROS: 14 point review of systems was negative except the symptoms listed above in the HPI.    OBJECTIVE:   GENERAL: healthy, alert and no distress.  HEENT: PERRL. Conjunctiva, sclera clear.  SKIN: Gonzalez Skin Type - IV.  Face examined. The dermatoscope was used to help evaluate pigmented lesions.  Skin Pertinent Findings:  Lower face- multiple nodules and inflammatory papules various stages of resolution     ASSESSMENT:     Encounter Diagnosis   Name Primary?     Acne vulgaris Yes     MDM: she is not trying to conceive at this time or in the near future . If the spironolactone is not effective then she would consider oral isotretinoin     PLAN:   Patient Instructions   Spironolactone 50 mg one tab per day  Continue BCP  Continue with tretinoin 0.05% at night time  Continue with clindamycin 1 % lotion  Recheck in 3 months      TT:   20 minutes.

## 2021-03-16 ENCOUNTER — OFFICE VISIT (OUTPATIENT)
Dept: FAMILY MEDICINE | Facility: CLINIC | Age: 36
End: 2021-03-16
Payer: COMMERCIAL

## 2021-03-16 VITALS — SYSTOLIC BLOOD PRESSURE: 118 MMHG | DIASTOLIC BLOOD PRESSURE: 82 MMHG

## 2021-03-16 DIAGNOSIS — L70.0 ACNE VULGARIS: Primary | ICD-10-CM

## 2021-03-16 PROCEDURE — 99213 OFFICE O/P EST LOW 20 MIN: CPT | Performed by: FAMILY MEDICINE

## 2021-03-16 RX ORDER — SPIRONOLACTONE 50 MG/1
50 TABLET, FILM COATED ORAL DAILY
Qty: 90 TABLET | Refills: 1 | Status: SHIPPED | OUTPATIENT
Start: 2021-03-16 | End: 2021-07-29

## 2021-03-16 NOTE — PATIENT INSTRUCTIONS
Spironolactone 50 mg one tab per day  Lab work in 3 months  Continue BCP  Continue with tretinoin 0.05% at night time  Continue with clindamycin 1 % lotion  Recheck in 3 months

## 2021-03-16 NOTE — LETTER
3/16/2021         RE: Shellie Nino  [id number]  [place] [address]        Dear Colleague,    Thank you for referring your patient, Shellie Nino, to the [facility]. Please see a copy of my visit note below.    [facility] - PRIMARY CARE SKIN    CC: Lesion(s)  SUBJECTIVE:   Shellie Nino is a(n) 35 year old female who presents to clinic today for follow up of her acne.     Issue One:   Current treatment : BCP ( restarted in December ) , retinoid 0.05% cream at night , clindamycin lotion two times per week   Previous treatments : BCP was effective for control but now trying to conceive so she stopped the BCP and now has more acne lesions on the lower face. Previous Mirena , made her acne worse.         Personal Medical History  Skin cancer: NO  Eczema Psoriasis Lupus   NO NO NO   Other:   .    Family Medical History  Skin cancer: NO  Eczema Psoriasis Lupus   NO NO NO   Other:   .      Occupation: ultrasound tech ().    Refer to electronic medical record (EMR) for past medical history and medications.        ROS: 14 point review of systems was negative except the symptoms listed above in the HPI.    OBJECTIVE:   GENERAL: healthy, alert and no distress.  HEENT: PERRL. Conjunctiva, sclera clear.  SKIN: Gonzalez Skin Type - IV.  Face examined. The dermatoscope was used to help evaluate pigmented lesions.  Skin Pertinent Findings:  Lower face- multiple nodules and inflammatory papules various stages of resolution     ASSESSMENT:     Encounter Diagnosis   Name Primary?     Acne vulgaris Yes     MDM: she is not trying to conceive at this time or in the near future . If the spironolactone is not effective then she would consider oral isotretinoin     PLAN:   Patient Instructions   Spironolactone 50 mg one tab per day  Continue BCP  Continue with tretinoin 0.05% at night time  Continue with clindamycin 1 % lotion  Recheck in 3 months      TT:   20  minutes.        Again, thank you for allowing me to participate in the care of your patient.        Sincerely,        Lauren Narvaez MD

## 2021-07-08 ENCOUNTER — MYC MEDICAL ADVICE (OUTPATIENT)
Dept: FAMILY MEDICINE | Facility: CLINIC | Age: 36
End: 2021-07-08

## 2021-07-08 DIAGNOSIS — L70.0 ACNE VULGARIS: Primary | ICD-10-CM

## 2021-07-09 ENCOUNTER — MYC MEDICAL ADVICE (OUTPATIENT)
Dept: DERMATOLOGY | Facility: CLINIC | Age: 36
End: 2021-07-09

## 2021-07-09 NOTE — TELEPHONE ENCOUNTER
[employer] message sent:    Thien Hensley-    That is correct- you do not need to come in.    I went ahead and canceled your appointment with Dr. Narvaez.    You just need labs- I suggest calling to make that lab appointment. You can go to any [place] lab for this and have standing orders.    Let me know if you have any other questions,    DEION Mccullough-BSN-PHN  [facility]  [phone]

## 2021-07-09 NOTE — TELEPHONE ENCOUNTER
[employer] message sent:    Thien Hensley-    You do not need an office visit- just labs, orders are in.    Let me know if you have any other questions,    DEION Mccullough-BSN-N  [facility]  [phone]

## 2021-07-21 ENCOUNTER — LAB (OUTPATIENT)
Dept: LAB | Facility: CLINIC | Age: 36
End: 2021-07-21
Payer: COMMERCIAL

## 2021-07-21 DIAGNOSIS — Z01.84 IMMUNITY STATUS TESTING: ICD-10-CM

## 2021-07-21 DIAGNOSIS — L70.0 ACNE VULGARIS: ICD-10-CM

## 2021-07-21 LAB
CREAT SERPL-MCNC: 0.8 MG/DL (ref 0.52–1.04)
GFR SERPL CREATININE-BSD FRML MDRD: >90 ML/MIN/1.73M2
POTASSIUM BLD-SCNC: 3.6 MMOL/L (ref 3.4–5.3)

## 2021-07-21 PROCEDURE — 86787 VARICELLA-ZOSTER ANTIBODY: CPT | Performed by: PHYSICIAN ASSISTANT

## 2021-07-21 PROCEDURE — 84132 ASSAY OF SERUM POTASSIUM: CPT | Performed by: PATHOLOGY

## 2021-07-21 PROCEDURE — 36415 COLL VENOUS BLD VENIPUNCTURE: CPT | Performed by: PATHOLOGY

## 2021-07-21 PROCEDURE — 82565 ASSAY OF CREATININE: CPT | Performed by: PATHOLOGY

## 2021-07-22 LAB
VZV IGG SER QL IA: 842.8 INDEX
VZV IGG SER QL IA: POSITIVE

## 2021-07-23 ENCOUNTER — TELEPHONE (OUTPATIENT)
Dept: FAMILY MEDICINE | Facility: CLINIC | Age: 36
End: 2021-07-23

## 2021-07-23 NOTE — TELEPHONE ENCOUNTER
Called and spoke to patient.    Educated patient on lab results- labs WNL to continue spironolactone.    Patient will call back when she needs refill.    Patient voiced understanding.    Jamel RN-BSN-PHN  [facility]  [phone]

## 2021-07-23 NOTE — TELEPHONE ENCOUNTER
----- Message from Lauren Narvaez MD sent at 7/21/2021  1:37 PM CDT -----  Please call,      Lab work looks normal.    Thank you,   Lauren Narvaez M.D.

## 2021-07-25 NOTE — RESULT ENCOUNTER NOTE
Shellie-      Your varicella titer is positive indicating that you are immune to the Chicken pox virus.  Please let me know if you have questions.      Lucho Crowell PA-C

## 2021-07-28 DIAGNOSIS — L70.0 ACNE VULGARIS: ICD-10-CM

## 2021-07-29 RX ORDER — SPIRONOLACTONE 50 MG/1
TABLET, FILM COATED ORAL
Qty: 90 TABLET | Refills: 0 | Status: SHIPPED | OUTPATIENT
Start: 2021-07-29 | End: 2023-05-25

## 2021-07-29 NOTE — TELEPHONE ENCOUNTER
"Last Written Prescription Date:  3/16/21  Last Fill Quantity: 90,  # refills: 1   Last office visit: 3/16/2021 with prescribing provider:     Future Office Visit:      Requested Prescriptions   Pending Prescriptions Disp Refills     spironolactone (ALDACTONE) 50 MG tablet [Pharmacy Med Name: Spironolactone Oral Tablet 50 MG] 90 tablet 0     Sig: TAKE ONE TABLET BY MOUTH ONE TIME DAILY       Diuretics (Including Combos) Protocol Failed - 7/28/2021  6:50 PM        Failed - Normal serum sodium on file in past 12 months     Recent Labs   Lab Test 09/18/18  0922                 Passed - Blood pressure under 140/90 in past 12 months     BP Readings from Last 3 Encounters:   03/16/21 118/82   02/01/21 134/84   11/19/20 118/60                 Passed - Recent (12 mo) or future (30 days) visit within the authorizing provider's specialty     Patient has had an office visit with the authorizing provider or a provider within the authorizing providers department within the previous 12 mos or has a future within next 30 days. See \"Patient Info\" tab in inbasket, or \"Choose Columns\" in Meds & Orders section of the refill encounter.              Passed - Medication is active on med list        Passed - Patient is age 18 or older        Passed - No active pregancy on record        Passed - Normal serum creatinine on file in past 12 months     Recent Labs   Lab Test 07/21/21  1304   CR 0.80              Passed - Normal serum potassium on file in past 12 months     Recent Labs   Lab Test 07/21/21  1304   POTASSIUM 3.6                    Passed - No positive pregnancy test in past 12 months             "

## 2021-08-03 DIAGNOSIS — L70.0 ACNE VULGARIS: ICD-10-CM

## 2021-08-03 RX ORDER — SPIRONOLACTONE 50 MG/1
TABLET, FILM COATED ORAL
Qty: 90 TABLET | Refills: 0 | OUTPATIENT
Start: 2021-08-03

## 2021-08-03 NOTE — TELEPHONE ENCOUNTER
"    Last Written Prescription Date:  7/29/21  Last Fill Quantity: 90,  # refills: 0   Last office visit: 3/16/2021 with prescribing provider:     Future Office Visit:        Requested Prescriptions   Pending Prescriptions Disp Refills     spironolactone (ALDACTONE) 50 MG tablet [Pharmacy Med Name: Spironolactone Oral Tablet 50 MG] 90 tablet 0     Sig: TAKE ONE TABLET BY MOUTH ONE TIME DAILY       Diuretics (Including Combos) Protocol Failed - 8/3/2021  6:46 AM        Failed - Normal serum sodium on file in past 12 months     Recent Labs   Lab Test 09/18/18  0922                 Passed - Blood pressure under 140/90 in past 12 months     BP Readings from Last 3 Encounters:   03/16/21 118/82   02/01/21 134/84   11/19/20 118/60                 Passed - Recent (12 mo) or future (30 days) visit within the authorizing provider's specialty     Patient has had an office visit with the authorizing provider or a provider within the authorizing providers department within the previous 12 mos or has a future within next 30 days. See \"Patient Info\" tab in inbasket, or \"Choose Columns\" in Meds & Orders section of the refill encounter.              Passed - Medication is active on med list        Passed - Patient is age 18 or older        Passed - No active pregancy on record        Passed - Normal serum creatinine on file in past 12 months     Recent Labs   Lab Test 07/21/21  1304   CR 0.80              Passed - Normal serum potassium on file in past 12 months     Recent Labs   Lab Test 07/21/21  1304   POTASSIUM 3.6                    Passed - No positive pregnancy test in past 12 months               "

## 2021-09-25 ENCOUNTER — HEALTH MAINTENANCE LETTER (OUTPATIENT)
Age: 36
End: 2021-09-25

## 2021-11-17 ENCOUNTER — OFFICE VISIT (OUTPATIENT)
Dept: DERMATOLOGY | Facility: CLINIC | Age: 36
End: 2021-11-17
Payer: COMMERCIAL

## 2021-11-17 DIAGNOSIS — D22.9 MULTIPLE BENIGN NEVI: ICD-10-CM

## 2021-11-17 DIAGNOSIS — L21.9 DERMATITIS, SEBORRHEIC: ICD-10-CM

## 2021-11-17 DIAGNOSIS — L70.0 ACNE VULGARIS: ICD-10-CM

## 2021-11-17 DIAGNOSIS — L81.4 SOLAR LENTIGO: ICD-10-CM

## 2021-11-17 DIAGNOSIS — L70.0 ACNE VULGARIS: Primary | ICD-10-CM

## 2021-11-17 PROCEDURE — 99204 OFFICE O/P NEW MOD 45 MIN: CPT | Performed by: DERMATOLOGY

## 2021-11-17 RX ORDER — SPIRONOLACTONE 25 MG/1
25 TABLET ORAL DAILY
Qty: 30 TABLET | Refills: 11 | Status: SHIPPED | OUTPATIENT
Start: 2021-11-17 | End: 2022-02-14

## 2021-11-17 RX ORDER — KETOCONAZOLE 20 MG/G
CREAM TOPICAL
Qty: 60 G | Refills: 11 | Status: SHIPPED | OUTPATIENT
Start: 2021-11-17 | End: 2024-01-10

## 2021-11-17 RX ORDER — SPIRONOLACTONE 50 MG/1
TABLET, FILM COATED ORAL
Qty: 90 TABLET | Refills: 0 | OUTPATIENT
Start: 2021-11-17

## 2021-11-17 ASSESSMENT — PAIN SCALES - GENERAL: PAINLEVEL: NO PAIN (0)

## 2021-11-17 NOTE — LETTER
11/17/2021       RE: Shellie Nino  [address]  [place] [address]     Dear Colleague,    Thank you for referring your patient, Shellie Nino, to the [facility] [place] at [facility]. Please see a copy of my visit note below.    [facility] Dermatology Note  Encounter Date: Nov 17, 2021  Office Visit     Dermatology Problem List:  # Acne vulgaris  - spironolactone 25 mg, OCP  - prior tx: clindamycin, tretinoin 0.05% cream  # Seborrheic dermatitis  - OTC dandruff shampoo and ketoconazole 2% cream  ____________________________________________    Assessment & Plan:    # Acne vulgaris, hormonal-induced acne, well controlled. Chronic, stable.   Improvement after discontinuing Mirena IUD and switching to OCP. We discussed tapering of spironolactone given relatively well controlled and patient was agreeable.   - Reduce spironolactone from 50 mg to 25 mg daily  - Continue OCP    # Seborrheic dermatitis. Chronic, flare.   - Recommended OTC dandruff shampoo  - Start ketoconazole 2% cream on forehead BID PRN    # Benign lesions: Multiple benign nevi, solar lentigos. Explained to patient benign nature of lesion. No treatment is necessary at this time unless the lesion changes or becomes symptomatic.  - Sun precaution was advised including the use of sun screens of SPF 30 or higher, sun protective clothing, and avoidance of tanning beds.    Procedures Performed:   None    Follow-up: 1 year(s) in-person, or earlier for new or changing lesions    Staff and Scribe:     Scribe Disclosure:  I, Khoa Santoyo, am serving as a scribe to document services personally performed by Johny Jacobson MD based on data collection and the provider's statements to me.     Provider Disclosure:   The documentation recorded by the scribe accurately reflects the services I personally performed and the decisions made by  me.    Johny Jacobson MD    Department of Dermatology  [facility]: Phone: [phone], Fax:[phone]  [facility]: Phone: [phone] Fax: [phone]  ____________________________________________    CC: Skin Check (Shellie is here today for a general skin exam. She does not have a hx of skin cancer. She does not have any particular concerns today. )    HPI:  Ms. Shellie Nino is a(n) 36 year old female who presents today as a new patient for FBSE.    Today, patient reports that she was seeing Dr. Javier for acne in [place]. She has been taking spironolactone, and she feels that her acne is well controlled. Her acne started after she switched birth control medications. Patient denies personal history of skin cancer or tanning bed use. She also reports that she has started using SPF when spending time outdoors in the last few years.    Patient is otherwise feeling well, without additional skin concerns.    Labs Reviewed:  N/A    Physical Exam:  Vitals: There were no vitals taken for this visit.  SKIN: Total skin excluding the undergarment areas was performed. The exam included the head/face, neck, both arms, chest, back, abdomen, both legs, digits and/or nails.   - Multiple regular brown pigmented macules and papules are identified on the trunk and extremities.   - Scattered brown macules on sun exposed areas.  - Face relatively clear of acneiform lesions.  - Some pink scaly thin plaques on the nasolabial folds, forehead, eyebrows, and scalp.  - No other lesions of concern on areas examined.     Medications:  Current Outpatient Medications   Medication     clindamycin (CLEOCIN T) 1 % external lotion     norgestim-eth estrad triphasic (ORTHO TRI-CYCLEN, 28,) 0.18/0.215/0.25 MG-35 MCG tablet     spironolactone (ALDACTONE) 50 MG tablet     tretinoin (RETIN-A) 0.05 % external  cream     No current facility-administered medications for this visit.      Past Medical History:   Patient Active Problem List   Diagnosis     Family history of renal cell carcinoma     Past Medical History:   Diagnosis Date     Family history of renal cell carcinoma 1/5/2017     Uterine inversion     first pregnancy

## 2021-11-17 NOTE — PROGRESS NOTES
[facility] Dermatology Note  Encounter Date: Nov 17, 2021  Office Visit     Dermatology Problem List:  # Acne vulgaris  - spironolactone 25 mg, OCP  - prior tx: clindamycin, tretinoin 0.05% cream  # Seborrheic dermatitis  - OTC dandruff shampoo and ketoconazole 2% cream  ____________________________________________    Assessment & Plan:    # Acne vulgaris, hormonal-induced acne, well controlled. Chronic, stable.   Improvement after discontinuing Mirena IUD and switching to OCP. We discussed tapering of spironolactone given relatively well controlled and patient was agreeable.   - Reduce spironolactone from 50 mg to 25 mg daily  - Continue OCP    # Seborrheic dermatitis. Chronic, flare.   - Recommended OTC dandruff shampoo  - Start ketoconazole 2% cream on forehead BID PRN    # Benign lesions: Multiple benign nevi, solar lentigos. Explained to patient benign nature of lesion. No treatment is necessary at this time unless the lesion changes or becomes symptomatic.  - Sun precaution was advised including the use of sun screens of SPF 30 or higher, sun protective clothing, and avoidance of tanning beds.    Procedures Performed:   None    Follow-up: 1 year(s) in-person, or earlier for new or changing lesions    Staff and Scribe:     Scribe Disclosure:  I, Khoa Santoyo, am serving as a scribe to document services personally performed by Johny Jacobson MD based on data collection and the provider's statements to me.     Provider Disclosure:   The documentation recorded by the scribe accurately reflects the services I personally performed and the decisions made by me.    Johny Jacobson MD    Department of Dermatology  [facility]: Phone: [phone], Fax:[phone]  [facility]: Phone: [phone] Fax: [phone]  ____________________________________________    CC:  Skin Check (Shellie is here today for a general skin exam. She does not have a hx of skin cancer. She does not have any particular concerns today. )    HPI:  Ms. Shellie Nino is a(n) 36 year old female who presents today as a new patient for FBSE.    Today, patient reports that she was seeing Dr. Javier for acne in [place]. She has been taking spironolactone, and she feels that her acne is well controlled. Her acne started after she switched birth control medications. Patient denies personal history of skin cancer or tanning bed use. She also reports that she has started using SPF when spending time outdoors in the last few years.    Patient is otherwise feeling well, without additional skin concerns.    Labs Reviewed:  N/A    Physical Exam:  Vitals: There were no vitals taken for this visit.  SKIN: Total skin excluding the undergarment areas was performed. The exam included the head/face, neck, both arms, chest, back, abdomen, both legs, digits and/or nails.   - Multiple regular brown pigmented macules and papules are identified on the trunk and extremities.   - Scattered brown macules on sun exposed areas.  - Face relatively clear of acneiform lesions.  - Some pink scaly thin plaques on the nasolabial folds, forehead, eyebrows, and scalp.  - No other lesions of concern on areas examined.     Medications:  Current Outpatient Medications   Medication     clindamycin (CLEOCIN T) 1 % external lotion     norgestim-eth estrad triphasic (ORTHO TRI-CYCLEN, 28,) 0.18/0.215/0.25 MG-35 MCG tablet     spironolactone (ALDACTONE) 50 MG tablet     tretinoin (RETIN-A) 0.05 % external cream     No current facility-administered medications for this visit.      Past Medical History:   Patient Active Problem List   Diagnosis     Family history of renal cell carcinoma     Past Medical History:   Diagnosis Date     Family history of renal cell carcinoma 1/5/2017     Uterine inversion     first pregnancy

## 2021-11-17 NOTE — NURSING NOTE
Dermatology Rooming Note    Shellie Camarillo Trung's goals for this visit include:   Chief Complaint   Patient presents with     Skin Check     Shellie is here today for a general skin exam. She does not have a hx of skin cancer. She does not have any particular concerns today.      Rai Berry, EMT

## 2022-02-14 DIAGNOSIS — L70.0 ACNE VULGARIS: ICD-10-CM

## 2022-02-14 RX ORDER — SPIRONOLACTONE 25 MG/1
25 TABLET ORAL 2 TIMES DAILY
Qty: 60 TABLET | Refills: 11 | Status: SHIPPED | OUTPATIENT
Start: 2022-02-14 | End: 2023-03-13

## 2022-02-18 ENCOUNTER — OFFICE VISIT (OUTPATIENT)
Dept: FAMILY MEDICINE | Facility: CLINIC | Age: 37
End: 2022-02-18
Payer: COMMERCIAL

## 2022-02-18 VITALS
SYSTOLIC BLOOD PRESSURE: 123 MMHG | HEIGHT: 65 IN | DIASTOLIC BLOOD PRESSURE: 81 MMHG | OXYGEN SATURATION: 97 % | BODY MASS INDEX: 28.37 KG/M2 | HEART RATE: 81 BPM | WEIGHT: 170.3 LBS

## 2022-02-18 DIAGNOSIS — Z76.89 ENCOUNTER TO ESTABLISH CARE: Primary | ICD-10-CM

## 2022-02-18 DIAGNOSIS — Z30.41 ENCOUNTER FOR SURVEILLANCE OF CONTRACEPTIVE PILLS: ICD-10-CM

## 2022-02-18 PROCEDURE — 99214 OFFICE O/P EST MOD 30 MIN: CPT | Performed by: NURSE PRACTITIONER

## 2022-02-18 RX ORDER — NORGESTIMATE AND ETHINYL ESTRADIOL 7DAYSX3 28
1 KIT ORAL DAILY
Qty: 84 TABLET | Refills: 3 | Status: SHIPPED | OUTPATIENT
Start: 2022-02-18 | End: 2023-01-19

## 2022-02-18 ASSESSMENT — ANXIETY QUESTIONNAIRES
3. WORRYING TOO MUCH ABOUT DIFFERENT THINGS: NOT AT ALL
7. FEELING AFRAID AS IF SOMETHING AWFUL MIGHT HAPPEN: NOT AT ALL
1. FEELING NERVOUS, ANXIOUS, OR ON EDGE: NOT AT ALL
2. NOT BEING ABLE TO STOP OR CONTROL WORRYING: NOT AT ALL
6. BECOMING EASILY ANNOYED OR IRRITABLE: NOT AT ALL
GAD7 TOTAL SCORE: 0
5. BEING SO RESTLESS THAT IT IS HARD TO SIT STILL: NOT AT ALL
IF YOU CHECKED OFF ANY PROBLEMS ON THIS QUESTIONNAIRE, HOW DIFFICULT HAVE THESE PROBLEMS MADE IT FOR YOU TO DO YOUR WORK, TAKE CARE OF THINGS AT HOME, OR GET ALONG WITH OTHER PEOPLE: NOT DIFFICULT AT ALL

## 2022-02-18 ASSESSMENT — ENCOUNTER SYMPTOMS
MUSCULOSKELETAL NEGATIVE: 1
ALLERGIC/IMMUNOLOGIC NEGATIVE: 1
HEMATOLOGIC/LYMPHATIC NEGATIVE: 1
ENDOCRINE NEGATIVE: 1
EYES NEGATIVE: 1
NEUROLOGICAL NEGATIVE: 1
GASTROINTESTINAL NEGATIVE: 1
CARDIOVASCULAR NEGATIVE: 1
RESPIRATORY NEGATIVE: 1
PSYCHIATRIC NEGATIVE: 1

## 2022-02-18 ASSESSMENT — PATIENT HEALTH QUESTIONNAIRE - PHQ9
5. POOR APPETITE OR OVEREATING: NOT AT ALL
SUM OF ALL RESPONSES TO PHQ QUESTIONS 1-9: 1

## 2022-02-18 NOTE — PATIENT INSTRUCTIONS
Patient Education     Watching Your Weight  Why do you need to watch your weight?    According to the CDC, more than 7 in 10 [place] adults older than 20 are either overweight or obese. Why is excess weight a concern? It may cause new health problems or worsen ones you already have.   Staying at a healthy weight is extra important if you have or had any of these:    Heart disease    Type 2 diabetes    Stroke    High blood pressure    Cancer of the uterus, gallbladder, kidney, stomach, breast, or colon    High total cholesterol level    Arthritis, especially osteoarthritis of the back, knees, or hips  If your weight is not in the healthy range for your height and build, the best way to lose weight is to set a reasonable goal and lose it slowly. For example, lose 1/2 pound to 1 pound a week. An initial weight loss goal of 5% to 7% of body weight is realistic for most people. Create a healthy pattern of eating and exercising that you can follow for the rest of your life.  Do you have excess weight?  Body mass index (BMI) is a measure of body fat based on height and weight. A BMI of 25 to 30 is considered overweight. A BMI over 30 is considered obese. To help you figure out if your weight is within a normal range, you can use a BMI calculator at the National Institutes of Health website.  If your weight is not in the healthy range for your height and build, the best ways to lose weight are to:      Set a reasonable goal. A realistic weight loss goal is 5% to 7% of your body weight.    Lose weight slowly. You can aim for   pound to 1 pound a week.    Build a healthy pattern of eating and exercise. Make this something that you can follow for the rest of your life.    Types of diets  Many types of diets can help with weight loss. These include low-calorie, low-carbohydrate, and Mediterranean diets.     Low-calorie diet. These diets can cause weight loss by having you eat fewer calories than you use. This causes the body to  use stored body fat for energy. Types of foods are not restricted, just the number of calories you have each day.    Low-carbohydrate diet. Low-carb diets cause your body to lower insulin. This is a hormone that causes hunger. The diet also causes your body to burn stored fat for energy. This eating plan limits refined carbohydrates. These include white bread, white rice, pasta, crackers, and sweets.    Mediterranean diet. This is based on the eating patterns of people who live in the Mediterranean region. It includes healthy fats found in olive oil and nuts. It also includes plenty of fresh fruits, vegetables, whole grains, legumes, and fish. It also allows wine in moderation, with meals. On this plan, you would not have any red meats, dairy foods, or processed foods.  But keep in mind that keeping up healthy eating behavior is more important than choosing a certain diet.  Watching calories  The best way to lose weight is to eat fewer calories than you use up each day. It doesn t matter if it is from fat, protein, or carbohydrate. A calorie is a calorie. High-fat foods tend to have more calories than foods that are high in carbohydrates or protein. You can eat a larger amount of foods that are low in fat as long as they are also low in calories. Be sure to check labels.   Other healthy ideas    Limit butter and margarine. Even better, switch to reduced-fat margarine. Or try jam on your bread, bagels, and other baked goods.    Choose light or low-fat dairy foods. This includes milk, cheese, yogurt, or sour cream. Drink 1% or skim milk. You will still get the nutrients and taste. But not the fat.    Use less salad dressing. Use just 1 tablespoon of dressing. Even better, use light or fat-free salad dressing. The same idea applies when using condiments. A little mayonnaise is all you need. Or use the light or fat-free kind.    Choose lean meats. These include beef round, loin, sirloin, pork loin chops, turkey,  chicken, and roasts. All cuts with the name loin or round are lean. If you cook it yourself, trim all visible fat. And drain the grease.    Use oils in small amounts. Try olive and canola oils. Bake chicken without the skin. Choose a potato instead of French fries.    Pick healthy, easy-to-grab foods. Try little bags or containers of ready-to-eat vegetables. These include celery sticks, cucumber wedges, cherry tomatoes, and baby carrots. Or make healthier choices for store-bought snacks, such as pretzels. Keep them with you in your briefcase, handbag, office, car, and home.    Eat when hungry and stop when full. Take smaller portions. Don't go back for seconds.    Think small when dining out. Restaurant servings are often too large. When dining out or ordering in, ask for half of a serving. Or get a doggy bag. That way you won t be as full, and you can have some tomorrow.    Be careful when ordering fast food. Not all fast food is high in fat and calories. Order a lean roast beef or grilled chicken sandwich. Stick with regular and small portion sizes. Order items without the cheese.    Cut down on drinks and sweets. Try not to drink alcohol or drinks with added sugar. Try to skip sweets such as candy, cakes, and cookies.  Getting exercise  Regular exercise is vital to manage your weight. But before you start a new exercise plan, talk with your healthcare provider if you:    Are older than 50. If you plan to begin a program of physical activity, first talk with your healthcare provider. This is to be sure you don t have heart disease or other health problems.    Have ongoing (chronic) health problems. These include heart disease, diabetes, or obesity. Also talk with your provider first if you are at high risk for these conditions.  Here are some exercise tips for staying at a healthy weight:    Try different types of exercise. Aerobic exercises and strengthening exercises burn calories by increasing your heart rate.  Try to include all 4 types of exercise: endurance, strength, balance, and flexibility.    Don't try to make exercise hard. Physical activity doesn t have to be strenuous to give you health benefits. No matter what your age, you can benefit from a medium amount of physical activity. Do this each day if possible. You can reach a medium amount of activity in longer sessions of moderately intense activities (such as 30 minutes of brisk walking). You can also reach it in shorter sessions of more strenuous activities (such as 15 to 20 minutes of jogging).    Start with short amounts of activity. If you have not been active, start with short intervals (5 to 10 minutes) of physical activity. Slowly build up to the activity level you want to reach.    Do things that you enjoy. If you like to walk and talk with friends, find a partner and start a walking routine. If you want to release stress-related energy or anxiety, try boxing. Find an exercise program you will enjoy!    Find ways to be active throughout the day. Use the stairs instead of the elevator. Do wall pushups while you wait for the breakfast coffee to brew. Park at the far end of the parking lot and walk briskly to the building. Even small changes--when done regularly--can make a big difference in your overall fitness.    Don't get discouraged if you miss a day or two. Vacations, illness, and schedule changes may interrupt your exercise plans. Just get back on track when the interruption is done.  [id number] last reviewed this educational content on 3/1/2019    9570-0727 [employer]. All rights reserved. This information is not intended as a substitute for professional medical care. Always follow your healthcare professional's instructions.

## 2022-02-18 NOTE — PROGRESS NOTES
ANNUAL WELLNESS EXAM     Today's Date: Feb 18, 2022     Patient Shellie Nino 1985 presents to the clinic today for a preventative health visit.    Sees OBGYN Dr. Villatoro at [facility] for her GYN care- had pelvic exam and breast exam end of last year, due for PAP in 2024. Patient had changed from ortho Tri-cyclen to Slynd oral birth control to see if is made any differnece since she had had a high BP reading- liked ortho Tri-cyclin better and would like refills today. Has had BP readings WNL since that visit.           SUBJECTIVE     History of Present Illness:      Allergies   Allergen Reactions     Penicillins      Promethazine      extra-pyramidal sx's      PHQ-9 score:    PHQ 2/18/2022   PHQ-9 Total Score 1   Q9: Thoughts of better off dead/self-harm past 2 weeks Not at all       NOHEMY-7 SCORE 2/1/2021 2/18/2022   Total Score 3 (minimal anxiety) -   Total Score 3 0           Current Outpatient Medications   Medication Instructions     clindamycin (CLEOCIN T) 1 % external lotion Topical, 2 TIMES DAILY     ketoconazole (NIZORAL) 2 % external cream Apply twice daily as needed for rash on the face.     norgestim-eth estrad triphasic (ORTHO TRI-CYCLEN, 28,) 0.18/0.215/0.25 MG-35 MCG tablet 1 tablet, Oral, DAILY     spironolactone (ALDACTONE) 50 MG tablet TAKE ONE TABLET BY MOUTH ONE TIME DAILY      spironolactone (ALDACTONE) 25 mg, Oral, 2 TIMES DAILY     tretinoin (RETIN-A) 0.05 % external cream Topical, AT BEDTIME       Past Medical History:   Diagnosis Date     Family history of renal cell carcinoma 1/5/2017     Uterine inversion     first pregnancy        Family History   Problem Relation Age of Onset     Kidney Cancer Mother      Other Cancer Mother         RCC     Hypertension Father      Diabetes Paternal Grandmother         Do you have a first-degree relative with a history of the following:  A. Cancer of the breast or ovaries - No   B. Heart attack, heart pain, or stroke before the age of  "55 - No  C. Unexplained death from drowning or car accident - No  D. Osteoporosis or any other significant bone health concerns - Yes- mother.     Social History     Tobacco Use     Smoking status: Never Smoker     Smokeless tobacco: Never Used   Substance Use Topics     Alcohol use: Not Currently     Comment: socially     Drug use: No        History   Sexual Activity     Sexual activity: Yes     Partners: Male     Birth control/ protection: None         No flowsheet data found.     Immunization History   Administered Date(s) Administered     COVID-19,PF,Pfizer (12+ Yrs) 12/30/2020, 01/19/2021     DTAP (<7y) 1985, 1985, 04/27/1984, 01/22/1985, 02/02/1999     Flu, Unspecified 10/01/2019, 10/05/2020     HPV 07/19/2013, 08/07/2014     HPV Quadrivalent 07/19/2013, 08/07/2014     HepB 10/11/2016, 01/01/2017     HepB-Adult 10/11/2016, 01/01/2017     Historical DTP/aP 07/26/1988     Influenza (IIV3) PF 10/18/2011     Influenza Vaccine IM > 6 months Valent IIV4 (Alfuria,Fluzone) 10/11/2016, 10/24/2019     Influenza Vaccine, 6+MO IM (QUADRIVALENT W/PRESERVATIVES) 10/20/2014     MMR 10/23/1984, 02/02/1999     Mantoux Tuberculin Skin Test 01/06/2017, 01/23/2017     OPV, trivalent, live 07/26/1988     Poliovirus, inactivated (IPV) 1983, 04/27/1984, 01/22/1985, 06/09/1988     TDAP Vaccine (Boostrix) 08/26/2010     Tdap (Adacel,Boostrix) 02/01/2021     Tdap (Adacel,boostrix) 08/26/2010     Twinrix A/B 08/07/2014        Health Maintenance Due   Topic Date Due     ADVANCE CARE PLANNING  Never done     PHQ-2  01/01/2022     PREVENTIVE CARE VISIT  02/01/2022      Health Maintenance components reviewed - Seasonal Influenza vaccine status is up to date & Covid-19 vaccine status is up to date.    Diet: in general, an \"unhealthy\" diet, patient states that she feels her diet had decreased in nutritional value over the last year. she has noticed a weight gain and has plans to start changing it to more healthy eating. " "    Exercise: 4-5 days/week for an average of 15-30 minutes    Living children: 2. 15 and 13 years old. The current method of family planning is oral contraceptive.     Review of Systems   Eyes: Negative.    Respiratory: Negative.    Cardiovascular: Negative.    Gastrointestinal: Negative.    Endocrine: Negative.    Breasts:  negative.    Genitourinary: Negative.    Musculoskeletal: Negative.    Allergic/Immunologic: Negative.    Neurological: Negative.    Hematological: Negative.    Psychiatric/Behavioral: Negative.             OBJECTIVE     /81 (BP Location: Right arm, Patient Position: Sitting, Cuff Size: Adult Regular)   Pulse 81   Ht 1.651 m (5' 5\")   Wt 77.2 kg (170 lb 4.8 oz)   LMP 02/09/2022 (Exact Date)   SpO2 97%   Breastfeeding No   BMI 28.34 kg/m         Estimated body mass index is 28.34 kg/m  as calculated from the following:    Height as of this encounter: 1.651 m (5' 5\").    Weight as of this encounter: 77.2 kg (170 lb 4.8 oz).    Complete \"Weight Managment Plan\" in the progress note from the Adult Preventative or Medicare smartsets, use phrase .WEIGHTPLAN, or choose an option from Weight Management Resources smartset below.        Labs:  Lab Results   Component Value Date    WBC 4.1 09/18/2018    HGB 12.3 09/18/2018    HCT 37.3 09/18/2018     09/18/2018    ALT 18 09/18/2018    AST 17 09/18/2018     09/18/2018    BUN 7 09/18/2018    CO2 28 09/18/2018    TSH 1.15 09/18/2018       Physical Exam  Constitutional:       Appearance: Normal appearance.   HENT:      Head: Normocephalic.      Nose: Nose normal.      Mouth/Throat:      Mouth: Mucous membranes are moist.   Eyes:      Pupils: Pupils are equal, round, and reactive to light.   Cardiovascular:      Rate and Rhythm: Normal rate and regular rhythm.      Heart sounds: Normal heart sounds.   Pulmonary:      Effort: Pulmonary effort is normal.      Breath sounds: Normal breath sounds.   Abdominal:      General: Abdomen is flat. " Bowel sounds are normal.   Musculoskeletal:         General: Normal range of motion.      Cervical back: Normal range of motion.   Skin:     General: Skin is warm.   Neurological:      General: No focal deficit present.      Mental Status: She is alert and oriented to person, place, and time.   Psychiatric:         Mood and Affect: Mood normal.               ASSESSMENT/PLAN       (Z76.89) Encounter to establish care  (primary encounter diagnosis)  Comment: Dd not draw labs today, no family history of hyperlipidemia, up to date with other health maintenance.  Plan: Discussed healthy weight loss and eating today. Verbal and printed information provided.     (Z30.41) Encounter for surveillance of contraceptive pills  Plan: norgestim-eth estrad triphasic (ORTHO         TRI-CYCLEN, 28,) 0.18/0.215/0.25 MG-35 MCG         tablet               -Discussed/Reinforced healthy diety, lifestyle, exercise and safety.  -Recommended completion of routine dental and eye exam.      Weight management plan: Discussed healthy diet and exercise guidelines     PAP (if applicable): Complete Date of Completion: 2024  Follow-up   Follow-Up:  Follow up in one year, or sooner if needed.     Patient engaged in their plan of care. Patient verbalized understanding and agreed with the final plan.  AVS printed and given to patient.    STEVIE Michael CNP   [facility]  [facility]  [address] [address]  [phone]

## 2022-02-19 ASSESSMENT — ANXIETY QUESTIONNAIRES: GAD7 TOTAL SCORE: 0

## 2022-03-12 ENCOUNTER — HEALTH MAINTENANCE LETTER (OUTPATIENT)
Age: 37
End: 2022-03-12

## 2022-03-31 ENCOUNTER — LAB (OUTPATIENT)
Dept: LAB | Facility: CLINIC | Age: 37
End: 2022-03-31
Payer: COMMERCIAL

## 2022-03-31 DIAGNOSIS — Z20.822 ENCOUNTER FOR LABORATORY TESTING FOR COVID-19 VIRUS: ICD-10-CM

## 2022-03-31 LAB — SARS-COV-2 RNA RESP QL NAA+PROBE: NEGATIVE

## 2022-03-31 PROCEDURE — U0005 INFEC AGEN DETEC AMPLI PROBE: HCPCS | Mod: 90 | Performed by: PATHOLOGY

## 2022-03-31 PROCEDURE — U0003 INFECTIOUS AGENT DETECTION BY NUCLEIC ACID (DNA OR RNA); SEVERE ACUTE RESPIRATORY SYNDROME CORONAVIRUS 2 (SARS-COV-2) (CORONAVIRUS DISEASE [COVID-19]), AMPLIFIED PROBE TECHNIQUE, MAKING USE OF HIGH THROUGHPUT TECHNOLOGIES AS DESCRIBED BY CMS-2020-01-R: HCPCS | Mod: 90 | Performed by: PATHOLOGY

## 2022-03-31 PROCEDURE — 99000 SPECIMEN HANDLING OFFICE-LAB: CPT | Performed by: PATHOLOGY

## 2022-12-26 ENCOUNTER — HEALTH MAINTENANCE LETTER (OUTPATIENT)
Age: 37
End: 2022-12-26

## 2023-01-16 DIAGNOSIS — Z30.41 ENCOUNTER FOR SURVEILLANCE OF CONTRACEPTIVE PILLS: ICD-10-CM

## 2023-01-19 RX ORDER — NORGESTIMATE AND ETHINYL ESTRADIOL 7DAYSX3 28
1 KIT ORAL DAILY
Qty: 28 TABLET | Refills: 0 | Status: SHIPPED | OUTPATIENT
Start: 2023-01-19 | End: 2023-03-13

## 2023-01-19 NOTE — TELEPHONE ENCOUNTER
Called patient and she scheduled a physical with Mary in March. However she is going to be having a GYN appointment for future birthcontrol refills and her PAP.   Mile EMERSON, EMT 3:41 PM 1/19/2023

## 2023-01-19 NOTE — TELEPHONE ENCOUNTER
norgestim-eth estrad triphasic (ORTHO TRI-CYCLEN, 28,) 0.18/0.215/0.25 MG-35 MCG tablet    Contraceptives Protocol Passed    2/18/2022  [facility] Abril Portillo APRN CNP  Internal Medicine

## 2023-03-12 ASSESSMENT — ENCOUNTER SYMPTOMS: BREAST MASS: 0

## 2023-03-13 ENCOUNTER — OFFICE VISIT (OUTPATIENT)
Dept: FAMILY MEDICINE | Facility: CLINIC | Age: 38
End: 2023-03-13
Payer: COMMERCIAL

## 2023-03-13 VITALS
HEART RATE: 61 BPM | OXYGEN SATURATION: 97 % | HEIGHT: 66 IN | SYSTOLIC BLOOD PRESSURE: 115 MMHG | BODY MASS INDEX: 27.63 KG/M2 | WEIGHT: 171.9 LBS | DIASTOLIC BLOOD PRESSURE: 77 MMHG | TEMPERATURE: 98.3 F

## 2023-03-13 DIAGNOSIS — L70.0 ACNE VULGARIS: ICD-10-CM

## 2023-03-13 DIAGNOSIS — L98.8 SKIN LESION OF BREAST: ICD-10-CM

## 2023-03-13 DIAGNOSIS — Z30.09 GENERAL COUNSELING FOR PRESCRIPTION OF ORAL CONTRACEPTIVES: ICD-10-CM

## 2023-03-13 DIAGNOSIS — Z30.41 ENCOUNTER FOR SURVEILLANCE OF CONTRACEPTIVE PILLS: ICD-10-CM

## 2023-03-13 DIAGNOSIS — Z00.00 ROUTINE HISTORY AND PHYSICAL EXAMINATION OF ADULT: Primary | ICD-10-CM

## 2023-03-13 DIAGNOSIS — Z00.00 HEALTH CARE MAINTENANCE: ICD-10-CM

## 2023-03-13 RX ORDER — NORGESTIMATE AND ETHINYL ESTRADIOL 7DAYSX3 28
1 KIT ORAL DAILY
Qty: 84 TABLET | Refills: 3 | Status: SHIPPED | OUTPATIENT
Start: 2023-03-13 | End: 2023-12-19

## 2023-03-13 ASSESSMENT — ANXIETY QUESTIONNAIRES
3. WORRYING TOO MUCH ABOUT DIFFERENT THINGS: NOT AT ALL
GAD7 TOTAL SCORE: 1
6. BECOMING EASILY ANNOYED OR IRRITABLE: NOT AT ALL
1. FEELING NERVOUS, ANXIOUS, OR ON EDGE: SEVERAL DAYS
GAD7 TOTAL SCORE: 1
5. BEING SO RESTLESS THAT IT IS HARD TO SIT STILL: NOT AT ALL
2. NOT BEING ABLE TO STOP OR CONTROL WORRYING: NOT AT ALL
7. FEELING AFRAID AS IF SOMETHING AWFUL MIGHT HAPPEN: NOT AT ALL

## 2023-03-13 ASSESSMENT — PATIENT HEALTH QUESTIONNAIRE - PHQ9
SUM OF ALL RESPONSES TO PHQ QUESTIONS 1-9: 0
5. POOR APPETITE OR OVEREATING: NOT AT ALL

## 2023-03-13 NOTE — Clinical Note
Shellie Sabillon is a patient of yours - she has an appointment with you at the end of May, I think she needs to be seen soon for a breast skin lesion that looks suspicious to me.  Thank you.   Mary Nelson

## 2023-03-13 NOTE — NURSING NOTE
"ROOM:2  JELANI ELLISON    Preferred Name: Shellie     How did you hear about us?  Current Patient    37 year old  Chief Complaint   Patient presents with     Physical       Blood pressure 115/77, pulse 61, temperature 98.3  F (36.8  C), temperature source Oral, height 1.666 m (5' 5.6\"), weight 78 kg (171 lb 14.4 oz), last menstrual period 02/22/2023, SpO2 97 %, not currently breastfeeding. Body mass index is 28.08 kg/m .  BP completed using cuff size:        Patient Active Problem List   Diagnosis     Family history of renal cell carcinoma       Wt Readings from Last 2 Encounters:   03/13/23 78 kg (171 lb 14.4 oz)   02/18/22 77.2 kg (170 lb 4.8 oz)     BP Readings from Last 3 Encounters:   03/13/23 115/77   02/18/22 123/81   03/16/21 118/82       Allergies   Allergen Reactions     Penicillins      Promethazine      extra-pyramidal sx's       Current Outpatient Medications   Medication     ketoconazole (NIZORAL) 2 % external cream     norgestim-eth estrad triphasic (ORTHO TRI-CYCLEN) 0.18/0.215/0.25 MG-35 MCG tablet     spironolactone (ALDACTONE) 25 MG tablet     tretinoin (RETIN-A) 0.05 % external cream     spironolactone (ALDACTONE) 50 MG tablet     No current facility-administered medications for this visit.       Social History     Tobacco Use     Smoking status: Never     Smokeless tobacco: Never   Substance Use Topics     Alcohol use: Not Currently     Comment: socially     Drug use: No       Honoring Choices - Health Care Directive Guide offered to patient at time of visit.    Health Maintenance Due   Topic Date Due     ADVANCE CARE PLANNING  Never done     YEARLY PREVENTIVE VISIT  02/01/2022       Immunization History   Administered Date(s) Administered     COVID-19 Vaccine 12+ (Pfizer) 12/30/2020, 01/19/2021, 09/30/2021     DTAP (<7y) 1985, 1985, 04/27/1984, 01/22/1985, 02/02/1999     Flu, Unspecified 10/01/2019, 10/05/2020     HPV 07/19/2013, 08/07/2014     HPV Quadrivalent 07/19/2013, 08/07/2014 "     HepB 10/11/2016, 01/01/2017     HepB-Adult 10/11/2016, 01/01/2017     Historical DTP/aP 07/26/1988     Influenza (IIV3) PF 10/18/2011     Influenza Vaccine >6 months (Alfuria,Fluzone) 10/11/2016, 10/24/2019     Influenza Vaccine, 6+MO IM (QUADRIVALENT W/PRESERVATIVES) 10/20/2014     MMR 10/23/1984, 02/02/1999     Mantoux Tuberculin Skin Test 01/06/2017, 01/23/2017     OPV, trivalent, live 07/26/1988     Poliovirus, inactivated (IPV) 1983, 04/27/1984, 01/22/1985, 06/09/1988     TDAP Vaccine (Boostrix) 08/26/2010     Tdap (Adacel,Boostrix) 02/01/2021     Tdap (Adacel,boostrix) 08/26/2010     Twinrix A/B 08/07/2014       Lab Results   Component Value Date    PAP NIL 08/07/2014       Recent Labs   Lab Test 07/21/21  1304 09/18/18  0922   ALT  --  18   CR 0.80 0.67   GFRESTIMATED >90 >90   GFRESTBLACK  --  >90   ALBUMIN  --  3.6   POTASSIUM 3.6 4.1   TSH  --  1.15       PHQ-2 ( 1999 Pfizer) 3/12/2023 2/1/2021   Q1: Little interest or pleasure in doing things 0 0   Q2: Feeling down, depressed or hopeless 0 0   PHQ-2 Score 0 0   PHQ-2 Total Score (12-17 Years)- Positive if 3 or more points; Administer PHQ-A if positive - 0   Q1: Little interest or pleasure in doing things Not at all -   Q2: Feeling down, depressed or hopeless Not at all -   PHQ-2 Score 0 -       PHQ-9 SCORE 10/31/2008 2/18/2022 3/13/2023   PHQ-9 Total Score 1 - -   PHQ-9 Total Score - 1 0       NOHEMY-7 SCORE 2/1/2021 2/18/2022 3/13/2023   Total Score 3 (minimal anxiety) - -   Total Score 3 0 1       No flowsheet data found.    Lindsay Pepe, [facility]    March 13, 2023 8:02 AM

## 2023-03-13 NOTE — PROGRESS NOTES
SUBJECTIVE:  Shellie Nino is a 37 year old female  presenting for annual physical exam. She has an irregular 2.5cm by 1cm dry patchy scab on her right breast that she says she first noticed in the summer of 2022 that she would like us to look at. Her pap smear is not due until next year, 2024.  She denies any recent illness or concerns today.        Patient questionnaire is completed and reviewed in detail with the patient.     Answers for HPI/ROS submitted by the patient on 3/12/2023  Frequency of exercise:: 2-3 days/week  Getting at least 3 servings of Calcium per day:: Yes  Diet:: Breakfast skipped  Taking medications regularly:: Yes  Medication side effects:: None  Bi-annual eye exam:: Yes  Dental care twice a year:: Yes  Sleep apnea or symptoms of sleep apnea:: None  pelvic pain: No  vaginal bleeding: No  vaginal discharge: No  tenderness: No  breast mass: No  breast discharge: No  Additional concerns today:: Yes  Duration of exercise:: 15-30 minutes      Past Medical History:   Diagnosis Date     Family history of renal cell carcinoma - Mother 1/5/2017     Uterine inversion     first pregnancy       Past Surgical History:   Procedure Laterality Date     COSMETIC MAMMOPLASTY AUGMENTATION  10/2011     ZZC NONSPECIFIC PROCEDURE  08/26/2005    Oral surgery       Family History   Problem Relation Age of Onset     Kidney Cancer Mother      Other Cancer Mother         RCC     Hypertension Father      Diabetes Paternal Grandmother        Social History     Socioeconomic History     Marital status:      Spouse name: Farooq     Number of children: 2     Years of education: Not on file     Highest education level: Not on file   Occupational History     Occupation: Ultrasound technician   Tobacco Use     Smoking status: Never     Smokeless tobacco: Never   Substance and Sexual Activity     Alcohol use: Socially - glass of wine a month     Comment: socially     Drug use: No     Sexual activity: Yes      Partners: Male     Birth control/protection: Pill   Other Topics Concern      Service No     Blood Transfusions No     Caffeine Concern 1-2 cups of coffee a day     Occupational Exposure Not Asked     Hobby Hazards Not Asked     Sleep Concern Denies     Stress Concern Sometimes stress r/t to parenting; manages well     Weight Concern None     Special Diet Not Asked     Back Care Not Asked     Exercise Yes - Moderate exercise walking or biking          Seat Belt Yes     Self-Exams Yes, breast self-exam     Parent/sibling w/ CABG, MI or angioplasty before 65F 55M? No   Social History Narrative    Living arrangements - the patient lives with her family.      Social Determinants of Health     Financial Resource Strain: Not on file   Food Insecurity: Not on file   Transportation Needs: Not on file   Physical Activity: Not on file   Stress: Not on file   Social Connections: Not on file   Intimate Partner Violence: Not on file   Housing Stability: Not on file       Current Outpatient Medications   Medication Sig Dispense Refill     ketoconazole (NIZORAL) 2 % external cream Apply twice daily as needed for rash on the face. 60 g 11     norgestim-eth estrad triphasic (ORTHO TRI-CYCLEN) 0.18/0.215/0.25 MG-35 MCG tablet Take 1 tablet by mouth daily 84 tablet 3     spironolactone (ALDACTONE) 25 MG tablet Take 1 tablet (25 mg) by mouth 2 times daily 60 tablet 11     tretinoin (RETIN-A) 0.05 % external cream Apply topically At Bedtime 20 g 3     spironolactone (ALDACTONE) 50 MG tablet TAKE ONE TABLET BY MOUTH ONE TIME DAILY  90 tablet 0          Allergies   Allergen Reactions     Penicillins      Promethazine      extra-pyramidal sx's       REVIEW OF SYSTEMS:    Skin: negative for ongoing, rash, bruising, lumps or bumps other than noted.  She has a dry rough patchy scab about 2cm in length on her right breast that she noticed first in summer of 2022.  Eyes: negative for, visual blurring, double vision,  "photophobia  Ears/Nose/Throat: negative for, nasal congestion, postnasal drainage  Respiratory: No shortness of breath, dyspnea on exertion, cough, or hemoptysis and negative  Cardiovascular: negative for, palpitations, chest pain, dyspnea on exertion and lower extremity edema  Breast: Positive for right breast lesion.  Gastrointestinal: negative for, nausea, vomiting and abdominal pain  Genitourinary: negative for, frequency, urgency and hesitancy  Musculoskeletal: negative for, joint pain, joint stiffness and muscular weakness  Neurologic: negative for, headaches, numbness or tingling of hands and numbness or tingling of feet  Psychiatric: negative for, anxiety and depression  Hematologic/Lymphatic/Immunologic: negative for, chills, fever, night sweats and weight loss  Endocrine: negative for, cold intolerance, heat intolerance, polyphagia, polydipsia and polyuria    PHYSICAL EXAM:    /77   Pulse 61   Temp 98.3  F (36.8  C) (Oral)   Ht 1.666 m (5' 5.6\")   Wt 78 kg (171 lb 14.4 oz)   LMP 02/22/2023   SpO2 97%   BMI 28.08 kg/m     I reviewed the vitals including BMI with the patient.       GENERAL APPEARANCE: healthy, alert and no distress     EYES: EOMI, PERRL     HENT: ear canals and TM's normal and nose and mouth without ulcers or lesions     NECK: no adenopathy, no asymmetry, masses, or scars and thyroid normal to palpation     RESP: lungs clear to auscultation - no rales, rhonchi or wheezes     CV: regular rates and rhythm, normal S1 S2, no S3 or S4 and no murmur, click or rub      BREAST: No nodules, dimpling or retraction of nipple. A 2.5cm by 1 cm patch irregular scab noted on the upper outer     quadrant of the right breast.     ABDOMEN:  soft, nontender, no HSM or masses and bowel sounds normal     MS: extremities normal- no gross deformities noted, no evidence of inflammation in joints, FROM in all extremities.     SKIN: no suspicious lesions or rashes     NEURO: Normal strength and tone, " sensory exam grossly normal, mentation intact and speech normal     PSYCH: mentation appears normal. and affect normal/bright     LYMPHATICS: No cervical adenopathy    ASSESSMENT/PLAN:    1. Routine history and physical examination of adult  History and physical exam finding negative apart from the right breast lesion as noted below.     2.  Skin lesion of breast  Derm referral, Dr. Indira thomson.  Follow up note sent to her dermatologist to see if she can get in sooner than the end of May.    3. Acne vulgaris  He acne is managed by dermatology. She is taking 50 mg of spironolactone which she reports is effectively managing her acne symptoms.     4. General counseling for prescription of oral contraceptives  She is taking norgestim-eth estrad triphasic (ORTHO TRI-CYCLEN) 0.18/0.215/0.25 MG-35 MCG tablet. She is inquiring if she should switch to another form of oral contraceptive. Looking back in her chat, it looks like the recommendation to switch to another oral contraceptive because she had elevated blood pressures. Her blood pressures today are normal thus not necessary to switch her current oral contraceptive regimen. She has been counseled on the plan and is agreeable.    3. Health care maintenance  Counseled on starting mammograms at age 43, and pap smears every 5 years (next pap 2024) as she is low risk. She is moderately active and declines any healthy lifestyle resources. He immunizations are up to date.    Return to Clinic:  Will return to clinic in 1 year for annuals or sooner as needed.     Rocío Valdez, NP Student  [employer].    Mary FRY, DNP, APRN, FNP, ANP, reviewed and verified the nurse practitioner (NP)  student's documentation of the patient's history.  I performed the exam and medical decision making activities with the NP student, who was present for learning purposes.

## 2023-03-14 DIAGNOSIS — L70.0 ACNE VULGARIS: ICD-10-CM

## 2023-03-17 RX ORDER — SPIRONOLACTONE 25 MG/1
25 TABLET ORAL 2 TIMES DAILY
Qty: 60 TABLET | Refills: 0 | Status: SHIPPED | OUTPATIENT
Start: 2023-03-17 | End: 2023-03-21

## 2023-03-17 NOTE — TELEPHONE ENCOUNTER
Spironolactone Oral Tablet 25 MG  Last Written Prescription Date:   7/29/2021  Last Fill Quantity: 90,   # refills: 0  Last Office Visit :  11/17/2021  Future Office visit:   3/21/2023    Routing refill request to provider for review/approval because:  Pt has visit on 3/21/2023  Refer to clinic for review and a refill to cover Pt until visit.       Jacqueline Torres RN  Central Triage Red Flags/Med Refills

## 2023-03-17 NOTE — TELEPHONE ENCOUNTER
Received refill request for spironolactone as the resident on call.   Reviewed patient's chart and attached communication.   Patient last seen 11/17/21, spironolactone was continued for acne. RTC scheduled for next week.     After reviewing the medication list and assessment and plan from last visit, the refill request was approved.    CC'ing Dr. Jacobson as MIKAELA.    Diego Price MD, PhD  PGY-2 Dermatology Resident

## 2023-03-21 ENCOUNTER — OFFICE VISIT (OUTPATIENT)
Dept: DERMATOLOGY | Facility: CLINIC | Age: 38
End: 2023-03-21
Payer: COMMERCIAL

## 2023-03-21 DIAGNOSIS — L70.0 ACNE VULGARIS: ICD-10-CM

## 2023-03-21 DIAGNOSIS — R21 RASH: Primary | ICD-10-CM

## 2023-03-21 PROCEDURE — 99214 OFFICE O/P EST MOD 30 MIN: CPT | Performed by: STUDENT IN AN ORGANIZED HEALTH CARE EDUCATION/TRAINING PROGRAM

## 2023-03-21 RX ORDER — SPIRONOLACTONE 25 MG/1
25 TABLET ORAL 2 TIMES DAILY
Qty: 60 TABLET | Refills: 0 | Status: SHIPPED | OUTPATIENT
Start: 2023-03-21 | End: 2023-04-25

## 2023-03-21 RX ORDER — MOMETASONE FUROATE 1 MG/G
OINTMENT TOPICAL 2 TIMES DAILY
Qty: 45 G | Refills: 1 | Status: SHIPPED | OUTPATIENT
Start: 2023-03-21 | End: 2024-05-29

## 2023-03-21 NOTE — PROGRESS NOTES
[facility] Dermatology Note    Encounter Date: Mar 21, 2023    Dermatology Problem List:  # Acne vulgaris  - spironolactone 25 mg, OCP  - prior tx: clindamycin, tretinoin 0.05% cream  # Seborrheic dermatitis  - OTC dandruff shampoo and ketoconazole 2% cream    Major PMHx  -   ______________________________________    Impression/Plan:  Shellie was seen today for skin check.    Diagnoses and all orders for this visit:    Rash  -     mometasone (ELOCON) 0.1 % external ointment; Apply topically 2 times daily  - 1x2.5cm plaque of erythematous eczematous dermatitis right periareolar area at about the 9 to 11 o'clock position,  - Looks most consistent with some sort of contact dermatitis or nummular eczema we will trial short course of topical steroids mometasone to be used twice daily under occlusion  - If not improving by day 10-14 we will have patient come back for biopsy to rule out things such as mammary Paget's    Acne vulgaris  -     spironolactone (ALDACTONE) 25 MG tablet; Take 1 tablet (25 mg) by mouth 2 times daily  Well-controlled on birth control and spironolactone taking 50 mg daily no breast tenderness intermenstrual spotting or orthostatic symptoms  - Continue 25 mg twice daily        Follow-up in 10 days via mychart.       Staff Involved:  Staff Only    Neftali Coello MD   of Dermatology  Department of Dermatology  [facility] [employer]      CC:   Chief Complaint   Patient presents with     Skin Check     Lesion of concern on patient's right breast that exhibits color change and growth has been present for roughly 5 months. Patient states she exhibits a sharp pain that is on/off. Pain along with a refill for spirolactone.  FBSE.       History of Present Illness:  Ms. Shellie Nino is a 37 year old female who presents as a return patient.    Spot on R breast that was first noticed a few months ago, feels like it has gotten bigger and darker      Labs:      Physical exam:  Vitals: LMP 02/22/2023   GEN: well developed, well-nourished, in no acute distress, in a pleasant mood.     SKIN: Gonzalez phototype   - Focused examination of the face and R breast was performed.  - minimal active acne  - rectangular area of erythema and ecezamtous scaling R guzman areolar breast   - No other lesions of concern on areas examined.     Past Medical History:   Past Medical History:   Diagnosis Date     Family history of renal cell carcinoma 1/5/2017     Uterine inversion     first pregnancy     Past Surgical History:   Procedure Laterality Date     COSMETIC MAMMOPLASTY AUGMENTATION  10/2011     Z NONSPECIFIC PROCEDURE  08/26/2005    Oral surgery       Social History:   reports that she has never smoked. She has never used smokeless tobacco. She reports that she does not currently use alcohol. She reports that she does not use drugs.    Family History:  Family History   Problem Relation Age of Onset     Kidney Cancer Mother      Other Cancer Mother         RCC     Hypertension Father      Diabetes Paternal Grandmother      Autoimmune Disease Sister        Medications:  Current Outpatient Medications   Medication Sig Dispense Refill     ketoconazole (NIZORAL) 2 % external cream Apply twice daily as needed for rash on the face. 60 g 11     mometasone (ELOCON) 0.1 % external ointment Apply topically 2 times daily 45 g 1     norgestim-eth estrad triphasic (ORTHO TRI-CYCLEN) 0.18/0.215/0.25 MG-35 MCG tablet Take 1 tablet by mouth daily 84 tablet 3     spironolactone (ALDACTONE) 25 MG tablet Take 1 tablet (25 mg) by mouth 2 times daily 60 tablet 0     tretinoin (RETIN-A) 0.05 % external cream Apply topically At Bedtime 20 g 3     spironolactone (ALDACTONE) 50 MG tablet TAKE ONE TABLET BY MOUTH ONE TIME DAILY  90 tablet 0     Allergies   Allergen Reactions     Penicillins      Promethazine      extra-pyramidal sx's

## 2023-03-21 NOTE — LETTER
3/21/2023       RE: Shellie Nino  [id number]  [place] [address]     Dear Colleague,    Thank you for referring your patient, Shellie Nino, to the [facility] [place] at [facility]. Please see a copy of my visit note below.    [facility] Dermatology Note    Encounter Date: Mar 21, 2023    Dermatology Problem List:  # Acne vulgaris  - spironolactone 25 mg, OCP  - prior tx: clindamycin, tretinoin 0.05% cream  # Seborrheic dermatitis  - OTC dandruff shampoo and ketoconazole 2% cream    Major PMHx  -   ______________________________________    Impression/Plan:  Shellie was seen today for skin check.    Diagnoses and all orders for this visit:    Rash  -     mometasone (ELOCON) 0.1 % external ointment; Apply topically 2 times daily  - 1x2.5cm plaque of erythematous eczematous dermatitis right periareolar area at about the 9 to 11 o'clock position,  - Looks most consistent with some sort of contact dermatitis or nummular eczema we will trial short course of topical steroids mometasone to be used twice daily under occlusion  - If not improving by day 10-14 we will have patient come back for biopsy to rule out things such as mammary Paget's    Acne vulgaris  -     spironolactone (ALDACTONE) 25 MG tablet; Take 1 tablet (25 mg) by mouth 2 times daily  Well-controlled on birth control and spironolactone taking 50 mg daily no breast tenderness intermenstrual spotting or orthostatic symptoms  - Continue 25 mg twice daily        Follow-up in 10 days via mychart.       Staff Involved:  Staff Only    Neftali Coello MD   of Dermatology  Department of Dermatology  [facility] [employer]      CC:   Chief Complaint   Patient presents with     Skin Check     Lesion of concern on patient's right breast that exhibits color change and growth has been present for roughly 5 months.  Patient states she exhibits a sharp pain that is on/off. Pain along with a refill for spirolactone.  FBSE.       History of Present Illness:  Ms. Shellie Nino is a 37 year old female who presents as a return patient.    Spot on R breast that was first noticed a few months ago, feels like it has gotten bigger and darker     Labs:      Physical exam:  Vitals: LMP 02/22/2023   GEN: well developed, well-nourished, in no acute distress, in a pleasant mood.     SKIN: Gonzalez phototype   - Focused examination of the face and R breast was performed.  - minimal active acne  - rectangular area of erythema and ecezamtous scaling R guzman areolar breast   - No other lesions of concern on areas examined.     Past Medical History:   Past Medical History:   Diagnosis Date     Family history of renal cell carcinoma 1/5/2017     Uterine inversion     first pregnancy     Past Surgical History:   Procedure Laterality Date     COSMETIC MAMMOPLASTY AUGMENTATION  10/2011     ZZC NONSPECIFIC PROCEDURE  08/26/2005    Oral surgery       Social History:   reports that she has never smoked. She has never used smokeless tobacco. She reports that she does not currently use alcohol. She reports that she does not use drugs.    Family History:  Family History   Problem Relation Age of Onset     Kidney Cancer Mother      Other Cancer Mother         RCC     Hypertension Father      Diabetes Paternal Grandmother      Autoimmune Disease Sister        Medications:  Current Outpatient Medications   Medication Sig Dispense Refill     ketoconazole (NIZORAL) 2 % external cream Apply twice daily as needed for rash on the face. 60 g 11     mometasone (ELOCON) 0.1 % external ointment Apply topically 2 times daily 45 g 1     norgestim-eth estrad triphasic (ORTHO TRI-CYCLEN) 0.18/0.215/0.25 MG-35 MCG tablet Take 1 tablet by mouth daily 84 tablet 3     spironolactone (ALDACTONE) 25 MG tablet Take 1 tablet (25 mg) by mouth 2 times daily 60 tablet 0      tretinoin (RETIN-A) 0.05 % external cream Apply topically At Bedtime 20 g 3     spironolactone (ALDACTONE) 50 MG tablet TAKE ONE TABLET BY MOUTH ONE TIME DAILY  90 tablet 0     Allergies   Allergen Reactions     Penicillins      Promethazine      extra-pyramidal sx's

## 2023-03-21 NOTE — NURSING NOTE
Dermatology Rooming Note    Shellie Nino's goals for this visit include:   Chief Complaint   Patient presents with     Skin Check     Lesion of concern on patient's right breast that exhibits color change and growth has been present for roughly 5 months. Patient states she exhibits a sharp pain that is on/off. Pain along with a refill for spirolactone.      Vin Rouse, EMT-B

## 2023-03-22 DIAGNOSIS — L70.0 ACNE VULGARIS: ICD-10-CM

## 2023-03-22 RX ORDER — TRETINOIN 0.5 MG/G
CREAM TOPICAL AT BEDTIME
Qty: 20 G | Refills: 3 | Status: SHIPPED | OUTPATIENT
Start: 2023-03-22 | End: 2023-05-25

## 2023-04-25 ENCOUNTER — MYC REFILL (OUTPATIENT)
Dept: DERMATOLOGY | Facility: CLINIC | Age: 38
End: 2023-04-25
Payer: COMMERCIAL

## 2023-04-25 DIAGNOSIS — L70.0 ACNE VULGARIS: ICD-10-CM

## 2023-05-02 RX ORDER — SPIRONOLACTONE 25 MG/1
25 TABLET ORAL 2 TIMES DAILY
Qty: 60 TABLET | Refills: 3 | Status: SHIPPED | OUTPATIENT
Start: 2023-05-02 | End: 2023-05-25

## 2023-05-02 NOTE — TELEPHONE ENCOUNTER
3/21/2023  [facility] Neftali Beatty MD  Dermatology     RF process # 2, labs due.

## 2023-05-24 NOTE — PROGRESS NOTES
[facility] Dermatology Note  Encounter Date: May 25, 2023  Office Visit     Dermatology Problem List:  1. Acne vulgaris  - spironolactone 25 mg BID, OCP, clindamycin 1% gel, tretinoin 0.05% cream  2. Seborrheic dermatitis  - OTC dandruff shampoo and ketoconazole 2% cream    ____________________________________________    Assessment & Plan:    # Acne vulgaris. Chronic, stable/improved.   Will continue current plan and add clindamycin gel for prn treatment.  - Continue spironolactone 25 mg BID  - Continue tretinoin 0.05% cream nightly  - Start clindamycin 1% gel BID prn    Procedures Performed:   None    Follow-up: 1 year(s) in-person, or earlier for new or changing lesions    Staff and Scribe:     Scribe Disclosure:  I, Khoa Santoyo, am serving as a scribe to document services personally performed by Johny Jacobson MD based on data collection and the provider's statements to me.     Provider Disclosure:   The documentation recorded by the scribe accurately reflects the services I personally performed and the decisions made by me.    Johny Jacobson MD    Department of Dermatology  [facility]: Phone: [phone], Fax:[phone]  [facility]: Phone: [phone] Fax: [phone]  ____________________________________________    CC: Acne (Pt following up for acne.)    HPI:  Ms. Shellie Nino is a(n) 37 year old female who presents today as a return patient for acne follow-up. Last seen by me on 3/21/2023, at which time patient was started on mometasone 0.1% ointment for treatment of rash.     Today, patient reports that her acne has improved. She has noticed that she gets one or two spots around the time of her periods. She has increased tretinoin to nightly. Denies breast tenderness, spotting, or light headedness on spironolactone.    Patient is otherwise  feeling well, without additional skin concerns.    Labs Reviewed:  N/A    Physical Exam:  Vitals: There were no vitals taken for this visit.  SKIN: Focused examination of face was performed.  - Few hyperpigmented macules on cheeks and chin but no active acneiform papules or pustules on exam today.  - No other lesions of concern on areas examined.     Medications:  Current Outpatient Medications   Medication     mometasone (ELOCON) 0.1 % external ointment     norgestim-eth estrad triphasic (ORTHO TRI-CYCLEN) 0.18/0.215/0.25 MG-35 MCG tablet     spironolactone (ALDACTONE) 25 MG tablet     tretinoin (RETIN-A) 0.05 % external cream     ketoconazole (NIZORAL) 2 % external cream     spironolactone (ALDACTONE) 50 MG tablet     No current facility-administered medications for this visit.      Past Medical History:   Patient Active Problem List   Diagnosis     Family history of renal cell carcinoma     Past Medical History:   Diagnosis Date     Family history of renal cell carcinoma 1/5/2017     Uterine inversion     first pregnancy

## 2023-05-25 ENCOUNTER — OFFICE VISIT (OUTPATIENT)
Dept: DERMATOLOGY | Facility: CLINIC | Age: 38
End: 2023-05-25
Payer: COMMERCIAL

## 2023-05-25 DIAGNOSIS — L70.0 ACNE VULGARIS: ICD-10-CM

## 2023-05-25 PROCEDURE — 99213 OFFICE O/P EST LOW 20 MIN: CPT | Performed by: DERMATOLOGY

## 2023-05-25 RX ORDER — CLINDAMYCIN PHOSPHATE 10 MG/G
GEL TOPICAL
Qty: 60 G | Refills: 11 | Status: SHIPPED | OUTPATIENT
Start: 2023-05-25 | End: 2024-03-03

## 2023-05-25 RX ORDER — SPIRONOLACTONE 25 MG/1
25 TABLET ORAL 2 TIMES DAILY
Qty: 60 TABLET | Refills: 3 | Status: SHIPPED | OUTPATIENT
Start: 2023-05-25 | End: 2024-03-03

## 2023-05-25 RX ORDER — TRETINOIN 0.5 MG/G
CREAM TOPICAL AT BEDTIME
Qty: 20 G | Refills: 3 | Status: SHIPPED | OUTPATIENT
Start: 2023-05-25 | End: 2024-03-03

## 2023-05-25 ASSESSMENT — PAIN SCALES - GENERAL: PAINLEVEL: NO PAIN (0)

## 2023-05-25 NOTE — LETTER
5/25/2023       RE: Shellie Nino  [address]  [place] [address]     Dear Colleague,    Thank you for referring your patient, Shellie Nino, to the [facility] [place] at [facility]. Please see a copy of my visit note below.    [facility] Dermatology Note  Encounter Date: May 25, 2023  Office Visit     Dermatology Problem List:  1. Acne vulgaris  - spironolactone 25 mg BID, OCP, clindamycin 1% gel, tretinoin 0.05% cream  2. Seborrheic dermatitis  - OTC dandruff shampoo and ketoconazole 2% cream    ____________________________________________    Assessment & Plan:    # Acne vulgaris. Chronic, stable/improved.   Will continue current plan and add clindamycin gel for prn treatment.  - Continue spironolactone 25 mg BID  - Continue tretinoin 0.05% cream nightly  - Start clindamycin 1% gel BID prn    Procedures Performed:   None    Follow-up: 1 year(s) in-person, or earlier for new or changing lesions    Staff and Scribe:     Scribe Disclosure:  I, Khoa Santoyo, am serving as a scribe to document services personally performed by Johny Jacobson MD based on data collection and the provider's statements to me.     Provider Disclosure:   The documentation recorded by the scribe accurately reflects the services I personally performed and the decisions made by me.    Johny Jacobson MD    Department of Dermatology  [facility]: Phone: [phone], Fax:[phone]  [facility]: Phone: [phone] Fax: [phone]  ____________________________________________    CC: Acne (Pt following up for acne.)    HPI:  Ms. Shellie Nino is a(n) 37 year old female who presents today as a return patient for acne follow-up. Last seen by me on 3/21/2023, at which time patient was  started on mometasone 0.1% ointment for treatment of rash.     Today, patient reports that her acne has improved. She has noticed that she gets one or two spots around the time of her periods. She has increased tretinoin to nightly. Denies breast tenderness, spotting, or light headedness on spironolactone.    Patient is otherwise feeling well, without additional skin concerns.    Labs Reviewed:  N/A    Physical Exam:  Vitals: There were no vitals taken for this visit.  SKIN: Focused examination of face was performed.  - Few hyperpigmented macules on cheeks and chin but no active acneiform papules or pustules on exam today.  - No other lesions of concern on areas examined.     Medications:  Current Outpatient Medications   Medication    mometasone (ELOCON) 0.1 % external ointment    norgestim-eth estrad triphasic (ORTHO TRI-CYCLEN) 0.18/0.215/0.25 MG-35 MCG tablet    spironolactone (ALDACTONE) 25 MG tablet    tretinoin (RETIN-A) 0.05 % external cream    ketoconazole (NIZORAL) 2 % external cream    spironolactone (ALDACTONE) 50 MG tablet     No current facility-administered medications for this visit.      Past Medical History:   Patient Active Problem List   Diagnosis    Family history of renal cell carcinoma     Past Medical History:   Diagnosis Date    Family history of renal cell carcinoma 1/5/2017    Uterine inversion     first pregnancy

## 2023-05-25 NOTE — NURSING NOTE
Chief Complaint   Patient presents with     Acne     Pt following up for acne.     Giovanny Cobian, EMT

## 2023-07-18 ENCOUNTER — E-VISIT (OUTPATIENT)
Dept: URGENT CARE | Facility: URGENT CARE | Age: 38
End: 2023-07-18
Payer: COMMERCIAL

## 2023-07-18 DIAGNOSIS — N39.0 ACUTE UTI (URINARY TRACT INFECTION): Primary | ICD-10-CM

## 2023-07-18 PROCEDURE — 99421 OL DIG E/M SVC 5-10 MIN: CPT | Performed by: PHYSICIAN ASSISTANT

## 2023-07-18 RX ORDER — NITROFURANTOIN 25; 75 MG/1; MG/1
100 CAPSULE ORAL 2 TIMES DAILY
Qty: 10 CAPSULE | Refills: 0 | Status: SHIPPED | OUTPATIENT
Start: 2023-07-18 | End: 2023-07-23

## 2023-07-18 NOTE — PATIENT INSTRUCTIONS
Dear Shellie Nino    After reviewing your responses, I've been able to diagnose you with a urinary tract infection, which is a common infection of the bladder with bacteria.  This is not a sexually transmitted infection, though urinating immediately after intercourse can help prevent infections.  Drinking lots of fluids is also helpful to clear your current infection and prevent the next one.      I have sent a prescription for antibiotics to your pharmacy to treat this infection.    It is important that you take all of your prescribed medication even if your symptoms are improving after a few doses.  Taking all of your medicine helps prevent the symptoms from returning.     If your symptoms worsen, you develop pain in your back or stomach, develop fevers, or are not improving in 5 days, please contact your primary care provider for an appointment or visit any of our convenient Walk-in or Urgent Care Centers to be seen, which can be found on our website here.    Thanks again for choosing us as your health care partner,    Ioana Carr PA-C    Urinary Tract Infections in Women  Urinary tract infections (UTIs) are most often caused by bacteria. These bacteria enter the urinary tract. The bacteria may come from inside the body. Or they may travel from the skin outside the rectum or vagina into the urethra. Female anatomy makes it easy for bacteria from the bowel to enter a woman s urinary tract, which is the most common source of UTI. This means women develop UTIs more often than men. Pain in or around the urinary tract is a common UTI symptom. But the only way to know for sure if you have a UTI for the healthcare provider to test your urine. The two tests that may be done are the urinalysis and urine culture.    Types of UTIs    Cystitis. A bladder infection (cystitis) is the most common UTI in women. You may have urgent or frequent need to pee. You may also have pain, burning when you pee, and bloody  urine.    Urethritis. This is an inflamed urethra, which is the tube that carries urine from the bladder to outside the body. You may have lower stomach or back pain. You may also have urgent or frequent need to pee.    Pyelonephritis. This is a kidney infection. If not treated, it can be serious and damage your kidneys. In severe cases, you may need to stay in the hospital. You may have a fever and lower back pain.    Medicines to treat a UTI  Most UTIs are treated with antibiotics. These kill the bacteria. The length of time you need to take them depends on the type of infection. It may be as short as 3 days. If you have repeated UTIs, you may need a low-dose antibiotic for several months. Take antibiotics exactly as directed. Don t stop taking them until all of the medicine is gone, even if you feel better. If you stop taking the antibiotic too soon, the infection may not go away. You may also develop a resistance to the antibiotic. This can make it much harder to treat.  Lifestyle changes to treat and prevent UTIs  The lifestyle changes below will help get rid of your UTI. They may also help prevent future UTIs.    Drink plenty of fluids. This includes water, juice, or other caffeine-free drinks. Fluids help flush bacteria out of your body.    Empty your bladder. Always empty your bladder when you feel the urge to pee. And always pee before going to sleep. Urine that stays in your bladder can lead to infection. Try to pee before and after sex as well.    Practice good personal hygiene. Wipe yourself from front to back after using the toilet. This helps keep bacteria from getting into the urethra.    Wear cotton underwear. Don't wear synthetic or tight-fitting underwear that can trap moisture. Change out of wet bathing suits and workout clothing quickly.    Take showers. Showers are better than baths for preventing UTIs.    Use condoms during sex. These help prevent UTIs caused by sexually transmitted bacteria.  Also don't use spermicides during sex. These can increase the risk for UTIs. Choose other forms of birth control instead. For women who tend to get UTIs after sex, a low-dose of a preventive antibiotic may be used. Be sure to discuss this option with your healthcare provider.    Follow up with your healthcare provider as directed. They may test to make sure the infection has cleared. If needed, more treatment may be started.  [employer] last reviewed this educational content on 9/1/2021 2000-2023 [employer]. All rights reserved. This information is not intended as a substitute for professional medical care. Always follow your healthcare professional's instructions.

## 2023-08-28 NOTE — TELEPHONE ENCOUNTER
----- Message from Christine Vasquez sent at 8/28/2023  9:27 AM CDT -----  Contact: Bela @[phone]  [email or web address]     Patient is calling for Medical Advice regarding:    Flu shot     Would like response via [facility]am[employer]:  call back     Comments:   Bela would like a call back to advise if pt can take a flu shot on 9.13.23 when he comes into be seen.      Pt calling wanting to clarify the COVID concerns - she found out that her step daughter was sent home for COVID like symptoms she is not positive however she will be going in to get tested today, the pt was advised that all the kids in her family will have to quarantine until the results are back.     Pt would like to know what she should do - RN advised that the school is following the MD guidelines for COVID and that they should all wait for results from her step daughter before resuming normal activities.       Pt stated an understanding and agreed with plan that they will stay quarantined until step daughters results then they will monitor for symptoms too     Samantha Damon RN, BSN  [facility]

## 2023-12-18 DIAGNOSIS — Z30.41 ENCOUNTER FOR SURVEILLANCE OF CONTRACEPTIVE PILLS: ICD-10-CM

## 2023-12-19 RX ORDER — NORGESTIMATE AND ETHINYL ESTRADIOL 7DAYSX3 28
1 KIT ORAL DAILY
Qty: 84 TABLET | Refills: 0 | Status: SHIPPED | OUTPATIENT
Start: 2023-12-19 | End: 2024-01-10

## 2023-12-19 NOTE — TELEPHONE ENCOUNTER
3/13/2023  [facility]     Mary Nelson NP  Family Medicine

## 2024-01-10 ENCOUNTER — OFFICE VISIT (OUTPATIENT)
Dept: FAMILY MEDICINE | Facility: CLINIC | Age: 39
End: 2024-01-10
Payer: COMMERCIAL

## 2024-01-10 VITALS
OXYGEN SATURATION: 98 % | RESPIRATION RATE: 17 BRPM | WEIGHT: 175.8 LBS | BODY MASS INDEX: 28.25 KG/M2 | HEIGHT: 66 IN | SYSTOLIC BLOOD PRESSURE: 127 MMHG | HEART RATE: 65 BPM | DIASTOLIC BLOOD PRESSURE: 81 MMHG | TEMPERATURE: 97.8 F

## 2024-01-10 DIAGNOSIS — Z30.41 ENCOUNTER FOR SURVEILLANCE OF CONTRACEPTIVE PILLS: ICD-10-CM

## 2024-01-10 DIAGNOSIS — F41.9 ANXIETY: Primary | ICD-10-CM

## 2024-01-10 RX ORDER — NORGESTIMATE AND ETHINYL ESTRADIOL 7DAYSX3 28
1 KIT ORAL DAILY
Qty: 84 TABLET | Refills: 3 | Status: SHIPPED | OUTPATIENT
Start: 2024-01-10 | End: 2024-03-03

## 2024-01-10 ASSESSMENT — ANXIETY QUESTIONNAIRES
6. BECOMING EASILY ANNOYED OR IRRITABLE: SEVERAL DAYS
1. FEELING NERVOUS, ANXIOUS, OR ON EDGE: SEVERAL DAYS
2. NOT BEING ABLE TO STOP OR CONTROL WORRYING: NOT AT ALL
GAD7 TOTAL SCORE: 3
IF YOU CHECKED OFF ANY PROBLEMS ON THIS QUESTIONNAIRE, HOW DIFFICULT HAVE THESE PROBLEMS MADE IT FOR YOU TO DO YOUR WORK, TAKE CARE OF THINGS AT HOME, OR GET ALONG WITH OTHER PEOPLE: SOMEWHAT DIFFICULT
3. WORRYING TOO MUCH ABOUT DIFFERENT THINGS: SEVERAL DAYS
7. FEELING AFRAID AS IF SOMETHING AWFUL MIGHT HAPPEN: NOT AT ALL
GAD7 TOTAL SCORE: 3
5. BEING SO RESTLESS THAT IT IS HARD TO SIT STILL: NOT AT ALL

## 2024-01-10 ASSESSMENT — PATIENT HEALTH QUESTIONNAIRE - PHQ9
SUM OF ALL RESPONSES TO PHQ QUESTIONS 1-9: 3
5. POOR APPETITE OR OVEREATING: NOT AT ALL

## 2024-01-10 ASSESSMENT — PAIN SCALES - GENERAL: PAINLEVEL: NO PAIN (0)

## 2024-01-10 NOTE — PATIENT INSTRUCTIONS
[facility]  [phone]    Employee Assistance Program HENRIQUE (CHARLY)

## 2024-01-10 NOTE — NURSING NOTE
"ROOM:1  JELANI ELLISON    Preferred Name: Shellie     How did you hear about us?  Current Patient    38 year old  Chief Complaint   Patient presents with     Anxiety     Doesn't have any appointment until may, has been looking into leaves at work     Contraception     Birth control refill       Blood pressure 127/81, pulse 65, temperature 97.8  F (36.6  C), temperature source Oral, resp. rate 17, height 1.676 m (5' 6\"), weight 79.7 kg (175 lb 12.8 oz), SpO2 98%, not currently breastfeeding. Body mass index is 28.37 kg/m .  BP completed using cuff size:        Patient Active Problem List   Diagnosis     Family history of renal cell carcinoma       Wt Readings from Last 2 Encounters:   01/10/24 79.7 kg (175 lb 12.8 oz)   03/13/23 78 kg (171 lb 14.4 oz)     BP Readings from Last 3 Encounters:   01/10/24 127/81   03/13/23 115/77   02/18/22 123/81       Allergies   Allergen Reactions     Penicillins      Promethazine      extra-pyramidal sx's       Current Outpatient Medications   Medication     clindamycin (CLINDAMAX) 1 % external gel     mometasone (ELOCON) 0.1 % external ointment     norgestim-eth estrad triphasic (ORTHO TRI-CYCLEN) 0.18/0.215/0.25 MG-35 MCG tablet     spironolactone (ALDACTONE) 25 MG tablet     tretinoin (RETIN-A) 0.05 % external cream     ketoconazole (NIZORAL) 2 % external cream     No current facility-administered medications for this visit.       Social History     Tobacco Use     Smoking status: Never     Smokeless tobacco: Never   Substance Use Topics     Alcohol use: Not Currently     Comment: socially     Drug use: No       Honoring Choices - Health Care Directive Guide offered to patient at time of visit.    Health Maintenance Due   Topic Date Due     ADVANCE CARE PLANNING  Never done     HPV IMMUNIZATION (3 - 3-dose SCDM series) 10/30/2014     PAP  01/01/2024       Immunization History   Administered Date(s) Administered     COVID-19 12+ (2023-24) (MODERNA) 10/23/2023     COVID-19 Bivalent " 12+ (Pfizer) 09/19/2022     COVID-19 MONOVALENT 12+ (Pfizer) 12/30/2020, 01/19/2021, 09/30/2021     DTAP (<7y) 1983, 1983, 04/27/1984, 01/22/1985, 02/02/1999     Flu, Unspecified 10/01/2019, 10/05/2020     HPV 07/19/2013, 08/07/2014     HPV Quadrivalent 07/19/2013, 08/07/2014     HepB 10/11/2016, 01/01/2017     Hepatitis B, Adult 10/11/2016, 01/01/2017     Historical DTP/aP 07/26/1988     Influenza (IIV3) PF 10/18/2011     Influenza Vaccine >6 months,quad, PF 10/11/2016, 10/24/2019     Influenza Vaccine, 6+MO IM (QUADRIVALENT W/PRESERVATIVES) 10/20/2014     MMR 10/23/1984, 02/02/1999     Mantoux Tuberculin Skin Test 01/06/2017, 01/23/2017     OPV, trivalent, live 07/26/1988     Poliovirus, inactivated (IPV) 1983, 04/27/1984, 01/22/1985, 06/09/1988     TDAP (Adacel,Boostrix) 02/01/2021     TDAP Vaccine (Boostrix) 08/26/2010     Tdap (Adacel,boostrix) 08/26/2010     Twinrix A/B 08/07/2014       Lab Results   Component Value Date    PAP NIL 08/07/2014       Recent Labs   Lab Test 07/21/21  1304 09/18/18  0922   ALT  --  18   CR 0.80 0.67   GFRESTIMATED >90 >90   GFRESTBLACK  --  >90   ALBUMIN  --  3.6   POTASSIUM 3.6 4.1   TSH  --  1.15           1/10/2024    10:11 AM 1/10/2024     9:04 AM   PHQ-2 ( 1999 Pfizer)   Q1: Little interest or pleasure in doing things 1 1   Q2: Feeling down, depressed or hopeless 1 0   PHQ-2 Score 2 1   Q1: Little interest or pleasure in doing things  Several days   Q2: Feeling down, depressed or hopeless  Not at all   PHQ-2 Score  1           10/31/2008     1:45 PM 2/18/2022     4:08 PM 3/13/2023     7:56 AM 1/10/2024    10:11 AM   PHQ-9 SCORE   PHQ-9 Total Score 1      PHQ-9 Total Score  1 0 3           2/18/2022     4:08 PM 3/13/2023     7:56 AM 1/10/2024    10:11 AM   NOHEMY-7 SCORE   Total Score 0 1 3            No data to display                Mile Graff, EMT    January 10, 2024 10:17 AM

## 2024-01-11 NOTE — PROGRESS NOTES
"Shellie Nino is a 38 year old female who presents today to discuss mental health and follow up for that.  She is feeling a significant amount of stress at work and at home.  She has been processing how to move forward so she feels that she is able to stay stable.  She has felt \"bad\" for the past several months.  She has reached out to a mental health care provider but that person is not available until May.  She has considered FMLA, she is looking at all of her options.  She has taken some time off from work and that has been helpful, she thinks she might need more time off.    Review Of Systems   ROS: 10 point ROS neg other than the symptoms noted above in the HPI.      Past Medical History:   Diagnosis Date     Family history of renal cell carcinoma 1/5/2017     Uterine inversion     first pregnancy     Past Surgical History:   Procedure Laterality Date     COSMETIC MAMMOPLASTY AUGMENTATION  10/2011     ZZC NONSPECIFIC PROCEDURE  08/26/2005    Oral surgery     Social History     Socioeconomic History     Marital status:      Spouse name: Farooq     Number of children: 2     Years of education: Not on file     Highest education level: Not on file   Occupational History     Occupation: [employer]   Tobacco Use     Smoking status: Never     Smokeless tobacco: Never   Substance and Sexual Activity     Alcohol use: Not Currently     Comment: socially     Drug use: No     Sexual activity: Yes     Partners: Male     Birth control/protection: Pill   Other Topics Concern      Service Not Asked     Blood Transfusions No     Caffeine Concern Not Asked     Occupational Exposure Not Asked     Hobby Hazards Not Asked     Sleep Concern Not Asked     Stress Concern Not Asked     Weight Concern Not Asked     Special Diet Not Asked     Back Care Not Asked     Exercise Yes     Bike Helmet Not Asked     Seat Belt Yes     Self-Exams No     Parent/sibling w/ CABG, MI or angioplasty before 65F 55M? No " "  Social History Narrative    Living arrangements - the patient lives with her family.      Social Determinants of Health     Financial Resource Strain: Low Risk  (1/10/2024)    Financial Resource Strain      Within the past 12 months, have you or your family members you live with been unable to get utilities (heat, electricity) when it was really needed?: No   Food Insecurity: Low Risk  (1/10/2024)    Food Insecurity      Within the past 12 months, did you worry that your food would run out before you got money to buy more?: No      Within the past 12 months, did the food you bought just not last and you didn t have money to get more?: No   Transportation Needs: Low Risk  (1/10/2024)    Transportation Needs      Within the past 12 months, has lack of transportation kept you from medical appointments, getting your medicines, non-medical meetings or appointments, work, or from getting things that you need?: No   Physical Activity: Not on file   Stress: Not on file   Social Connections: Not on file   Interpersonal Safety: Low Risk  (1/10/2024)    Interpersonal Safety      Do you feel physically and emotionally safe where you currently live?: Yes      Within the past 12 months, have you been hit, slapped, kicked or otherwise physically hurt by someone?: No      Within the past 12 months, have you been humiliated or emotionally abused in other ways by your partner or ex-partner?: No   Housing Stability: Low Risk  (1/10/2024)    Housing Stability      Do you have housing? : Yes      Are you worried about losing your housing?: No     Family History   Problem Relation Age of Onset     Kidney Cancer Mother      Other Cancer Mother         RCC     Hypertension Father      Diabetes Paternal Grandmother      Autoimmune Disease Sister        /81 (BP Location: Left arm, Patient Position: Sitting, Cuff Size: Adult Regular)   Pulse 65   Temp 97.8  F (36.6  C) (Oral)   Resp 17   Ht 1.676 m (5' 6\")   Wt 79.7 kg (175 lb " 12.8 oz)   SpO2 98%   BMI 28.37 kg/m      Exam:  Constitutional: alert, no distress and mild distress  Psychiatric: mentation appears normal, affect normal/bright, anxious and worried    Assessment/Plan:    (F41.9) Anxiety  (primary encounter diagnosis)  Comment: 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          Options for treatment and follow-up care were reviewed with the patient. Patient engaged in the decision making process and verbalized understanding of the options discussed and agreed with the final plan.

## 2024-02-04 ENCOUNTER — HEALTH MAINTENANCE LETTER (OUTPATIENT)
Age: 39
End: 2024-02-04

## 2024-03-03 ENCOUNTER — MYC REFILL (OUTPATIENT)
Dept: DERMATOLOGY | Facility: CLINIC | Age: 39
End: 2024-03-03
Payer: COMMERCIAL

## 2024-03-03 ENCOUNTER — MYC REFILL (OUTPATIENT)
Dept: FAMILY MEDICINE | Facility: CLINIC | Age: 39
End: 2024-03-03

## 2024-03-03 DIAGNOSIS — Z30.41 ENCOUNTER FOR SURVEILLANCE OF CONTRACEPTIVE PILLS: ICD-10-CM

## 2024-03-03 DIAGNOSIS — L70.0 ACNE VULGARIS: ICD-10-CM

## 2024-03-04 RX ORDER — SPIRONOLACTONE 25 MG/1
25 TABLET ORAL 2 TIMES DAILY
Qty: 60 TABLET | Refills: 2 | Status: SHIPPED | OUTPATIENT
Start: 2024-03-04 | End: 2024-05-29

## 2024-03-04 RX ORDER — CLINDAMYCIN PHOSPHATE 10 MG/G
GEL TOPICAL
Qty: 60 G | Refills: 2 | Status: SHIPPED | OUTPATIENT
Start: 2024-03-04 | End: 2024-05-29

## 2024-03-04 RX ORDER — TRETINOIN 0.5 MG/G
CREAM TOPICAL AT BEDTIME
Qty: 20 G | Refills: 2 | Status: SHIPPED | OUTPATIENT
Start: 2024-03-04 | End: 2024-05-29

## 2024-03-04 NOTE — TELEPHONE ENCOUNTER
Authorizing refills until patients next visit with Dr. Yazmin Marks MD on 5/29/2024.     Deb Marquez, CathleenD

## 2024-03-05 NOTE — TELEPHONE ENCOUNTER
norgestim-eth estrad triphasic (ORTHO TRI-CYCLEN) 0.18/0.215/0.25 MG-35 MCG tablet   Patient comment: Hi, I would like this medication moved/sent to the [facility] [address], [place] [address]. Thanks!     Faxing capability is not available for selected pharmacies and medication orders of the following classes: E-Prescribe  Change the pharmacy or the order class.       NORGESTIM-ETH ESTRAD TRIPHASIC 0.18/0.215/0.25 MG-35 MCG PO TABS [[id number]] - E-Prescribe     I have changed the order to E-scribe and it will not let me send it.  Please review and assist with sending this order to updated pharmacy for Pt care.   Thank you     Jacqueline Torres RN  [facility] Red Flags/Med Refills

## 2024-03-07 RX ORDER — NORGESTIMATE AND ETHINYL ESTRADIOL 7DAYSX3 28
1 KIT ORAL DAILY
Qty: 84 TABLET | Refills: 3 | Status: SHIPPED | OUTPATIENT
Start: 2024-03-07 | End: 2024-03-11

## 2024-03-11 ENCOUNTER — MYC REFILL (OUTPATIENT)
Dept: FAMILY MEDICINE | Facility: CLINIC | Age: 39
End: 2024-03-11

## 2024-03-11 DIAGNOSIS — Z30.41 ENCOUNTER FOR SURVEILLANCE OF CONTRACEPTIVE PILLS: ICD-10-CM

## 2024-03-13 RX ORDER — NORGESTIMATE AND ETHINYL ESTRADIOL 7DAYSX3 28
1 KIT ORAL DAILY
Qty: 84 TABLET | Refills: 3 | Status: SHIPPED | OUTPATIENT
Start: 2024-03-13

## 2024-04-14 ENCOUNTER — HEALTH MAINTENANCE LETTER (OUTPATIENT)
Age: 39
End: 2024-04-14

## 2024-05-29 ENCOUNTER — OFFICE VISIT (OUTPATIENT)
Dept: DERMATOLOGY | Facility: CLINIC | Age: 39
End: 2024-05-29
Payer: COMMERCIAL

## 2024-05-29 DIAGNOSIS — R21 RASH: ICD-10-CM

## 2024-05-29 DIAGNOSIS — L70.0 ACNE VULGARIS: ICD-10-CM

## 2024-05-29 PROCEDURE — 99214 OFFICE O/P EST MOD 30 MIN: CPT | Performed by: DERMATOLOGY

## 2024-05-29 RX ORDER — MOMETASONE FUROATE 1 MG/G
OINTMENT TOPICAL 2 TIMES DAILY
Qty: 45 G | Refills: 3 | Status: SHIPPED | OUTPATIENT
Start: 2024-05-29

## 2024-05-29 RX ORDER — SPIRONOLACTONE 25 MG/1
25 TABLET ORAL 2 TIMES DAILY
Qty: 180 TABLET | Refills: 3 | Status: SHIPPED | OUTPATIENT
Start: 2024-05-29

## 2024-05-29 RX ORDER — CLINDAMYCIN PHOSPHATE 10 MG/G
GEL TOPICAL
Qty: 60 G | Refills: 11 | Status: SHIPPED | OUTPATIENT
Start: 2024-05-29

## 2024-05-29 RX ORDER — TRETINOIN 0.5 MG/G
CREAM TOPICAL AT BEDTIME
Qty: 20 G | Refills: 11 | Status: SHIPPED | OUTPATIENT
Start: 2024-05-29

## 2024-05-29 ASSESSMENT — PAIN SCALES - GENERAL: PAINLEVEL: NO PAIN (0)

## 2024-05-29 NOTE — NURSING NOTE
Dermatology Rooming Note    Shellie Nino's goals for this visit include:   Chief Complaint   Patient presents with    Derm Problem     Acne- improved on spironolactone     EAGLE Riggins

## 2024-05-29 NOTE — PROGRESS NOTES
[facility] Dermatology Note  Encounter Date: May 29, 2024  Office Visit     Dermatology Problem List:  1. Acne vulgaris  - spironolactone 25 mg BID, OCP, clindamycin 1% gel, tretinoin 0.05% cream  2. Seborrheic dermatitis  - OTC dandruff shampoo and ketoconazole 2% cream  3. Eczematous dermatitis    ____________________________________________    Assessment & Plan:    # Acne vulgaris. Chronic, stable/improved  Continue with current treatment plan  -Continue spironolactone 25 mg BID   -Continue tretinoin 0.05% cream nightly  -Continue clindamycin 1% gel BID prn    # Eczematous dermatitis. Well managed with mometasone as needed  - Continue 0.1% mometasone BID prn    Procedures Performed:   None    Follow-up: 1 year(s) in-person, or earlier for new or changing lesions    Staff and Medical Student:     Castro Bobo MS3  [facility]    I was present with the medical student who participated in the service and in the documentation of the note. I have verified the history and personally performed the physical exam and medical decision making. I agree with the assessment and plan of care as documented in the note.  Yazmin Marks MD   ____________________________________________    CC: Derm Problem (Acne- improved on spironolactone)    HPI:  Ms. Shellie Nino is a(n) 38 year old female who presents today as a new patient to Dr Marks for acne. Patient was last seen 5/25/23 for acne where she was continued on 50mg spironolactone daily and 0.05% tretinoin nightly, and started 1% clindamycin gel BID prn for spot treatment. Patient states that things have been going well. She has no complaints of acne flairs and would like to continue the current treatment. She states that she does have an itchy pigmented spot on her left breast that she has been using mometasone prn. This treatment has been successful at getting rid of the spot and she has no concerns with this treatment. She  currently has a spot on her left breast that she just started putting mometasone on.     Patient is otherwise feeling well, without additional skin concerns.    Labs:  None reviewed.    Physical Exam:  Vitals: There were no vitals taken for this visit.  SKIN: Focused examination of left breast and face was performed.  - Hyperpigmented scaly plaque on left breast  - No sign of comedones or inflamed lesions on the face  - No other lesions of concern on areas examined.     Medications:  Current Outpatient Medications   Medication Sig Dispense Refill    clindamycin (CLEOCIN-T) 1 % external gel Apply twice daily as needed for flares of the acne 60 g 11    mometasone (ELOCON) 0.1 % external ointment Apply topically 2 times daily 45 g 3    norgestim-eth estrad triphasic (ORTHO TRI-CYCLEN) 0.18/0.215/0.25 MG-35 MCG tablet Take 1 tablet by mouth daily 84 tablet 3    spironolactone (ALDACTONE) 25 MG tablet Take 1 tablet (25 mg) by mouth 2 times daily 180 tablet 3    tretinoin (RETIN-A) 0.05 % external cream Apply topically at bedtime 20 g 11     No current facility-administered medications for this visit.      Past Medical History:   Patient Active Problem List   Diagnosis    Family history of renal cell carcinoma     Past Medical History:   Diagnosis Date    Family history of renal cell carcinoma 1/5/2017    Uterine inversion     first pregnancy        CC Referred Self, MD  No address on file on close of this encounter.

## 2024-05-29 NOTE — LETTER
5/29/2024       RE: Shellie Nino  [address]  [place] [address]     Dear Colleague,    Thank you for referring your patient, Shellie Nino, to the [facility] [place] at [facility]. Please see a copy of my visit note below.    [facility] Dermatology Note  Encounter Date: May 29, 2024  Office Visit     Dermatology Problem List:  1. Acne vulgaris  - spironolactone 25 mg BID, OCP, clindamycin 1% gel, tretinoin 0.05% cream  2. Seborrheic dermatitis  - OTC dandruff shampoo and ketoconazole 2% cream  3. Eczematous dermatitis    ____________________________________________    Assessment & Plan:    # Acne vulgaris. Chronic, stable/improved  Continue with current treatment plan  -Continue spironolactone 25 mg BID   -Continue tretinoin 0.05% cream nightly  -Continue clindamycin 1% gel BID prn    # Eczematous dermatitis. Well managed with mometasone as needed  - Continue 0.1% mometasone BID prn    Procedures Performed:   None    Follow-up: 1 year(s) in-person, or earlier for new or changing lesions    Staff and Medical Student:     Castro Bobo MS3  [facility]    I was present with the medical student who participated in the service and in the documentation of the note. I have verified the history and personally performed the physical exam and medical decision making. I agree with the assessment and plan of care as documented in the note.  Yazmin Marks MD   ____________________________________________    CC: Derm Problem (Acne- improved on spironolactone)    HPI:  Ms. Shellie Nino is a(n) 38 year old female who presents today as a new patient to Dr Marks for acne. Patient was last seen 5/25/23 for acne where she was continued on 50mg spironolactone daily and 0.05% tretinoin nightly, and started 1% clindamycin gel BID prn for spot treatment. Patient states that things have been  going well. She has no complaints of acne flairs and would like to continue the current treatment. She states that she does have an itchy pigmented spot on her left breast that she has been using mometasone prn. This treatment has been successful at getting rid of the spot and she has no concerns with this treatment. She currently has a spot on her left breast that she just started putting mometasone on.     Patient is otherwise feeling well, without additional skin concerns.    Labs:  None reviewed.    Physical Exam:  Vitals: There were no vitals taken for this visit.  SKIN: Focused examination of left breast and face was performed.  - Hyperpigmented scaly plaque on left breast  - No sign of comedones or inflamed lesions on the face  - No other lesions of concern on areas examined.     Medications:  Current Outpatient Medications   Medication Sig Dispense Refill    clindamycin (CLEOCIN-T) 1 % external gel Apply twice daily as needed for flares of the acne 60 g 11    mometasone (ELOCON) 0.1 % external ointment Apply topically 2 times daily 45 g 3    norgestim-eth estrad triphasic (ORTHO TRI-CYCLEN) 0.18/0.215/0.25 MG-35 MCG tablet Take 1 tablet by mouth daily 84 tablet 3    spironolactone (ALDACTONE) 25 MG tablet Take 1 tablet (25 mg) by mouth 2 times daily 180 tablet 3    tretinoin (RETIN-A) 0.05 % external cream Apply topically at bedtime 20 g 11     No current facility-administered medications for this visit.      Past Medical History:   Patient Active Problem List   Diagnosis    Family history of renal cell carcinoma     Past Medical History:   Diagnosis Date    Family history of renal cell carcinoma 1/5/2017    Uterine inversion     first pregnancy        CC Referred Self,

## 2024-06-06 ENCOUNTER — ANCILLARY PROCEDURE (OUTPATIENT)
Dept: MAMMOGRAPHY | Facility: CLINIC | Age: 39
End: 2024-06-06
Attending: NURSE PRACTITIONER
Payer: COMMERCIAL

## 2024-06-06 DIAGNOSIS — Z12.31 VISIT FOR SCREENING MAMMOGRAM: ICD-10-CM

## 2024-06-06 PROCEDURE — 77067 SCR MAMMO BI INCL CAD: CPT | Mod: GC

## 2024-06-06 PROCEDURE — 77063 BREAST TOMOSYNTHESIS BI: CPT | Mod: GC

## 2024-10-03 PROBLEM — L70.9 ACNE: Status: ACTIVE | Noted: 2022-02-03

## 2024-10-03 NOTE — PROGRESS NOTES
Shellie is a 39 year old  female who presents for annual exam.     Besides routine health maintenance,  she would like to discuss BC option and age.    HPI:  The patient's PCP is Mayr Nelson NP.     New patient to me here today for her annual GYN exam.  She had a mammogram that was negative this year.  She is due for a Pap smear.  She has questions about continuing on her oral contraceptive tablets.  She is using them for contraception.  She has normal menstrual cycles but does occasionally skip her menstrual cycle.  She has an ultrasound sonographer at the .    She is very active and swims 4 times a week.    She accepts STD testing today.      GYNECOLOGIC HISTORY:    Patient's last menstrual period was 2024 (exact date).    Regular menses? yes  Menses every 28 days.  Length of menses: 5 days    Her current contraception method is: oral contraceptives.  She  reports that she has never smoked. She has never used smokeless tobacco.    Patient is sexually active.  STD testing offered?  Accepted  Last PHQ-9 score on record =       1/10/2024    10:11 AM   PHQ-9 SCORE   PHQ-9 Total Score 3     Last GAD7 score on record =       1/10/2024    10:11 AM   NOHEMY-7 SCORE   Total Score 3     Alcohol Score =     HEALTH MAINTENANCE:  Cholesterol:  NA  Last Mammo:  24 , Result: Normal, Next Mammo:  2025  Pap:  Lab Results   Component Value Date    PAP NIL 2014    PAP NIL 2011    PAP NIL 2010     Colonoscopy:  NA, Result: Not applicable, Next Colonoscopy: 43 years.  Dexa:  NA    Health maintenance updated: Yes    HISTORY:  OB History    Para Term  AB Living   3 2 2 0 1 2   SAB IAB Ectopic Multiple Live Births   1 0 0 0 2      # Outcome Date GA Lbr Bert/2nd Weight Sex Type Anes PTL Lv   3 SAB 07/08/15 4w0d          2 Term 08 40w0d 12:00 3.459 kg (7 lb 10 oz) F IVD EPI  INES      Birth Comments: manual removal of placenta      Name: Kaylee   1 Term 06 40w0d 14:00 3.459 kg  "(7 lb 10 oz) F IVD EPIDURAL  INES      Birth Comments: uterine inversion      Name: Feroz      Obstetric Comments   Pregnancy with IUD in utero.  IUD removed and pregnancy resolved       Patient Active Problem List   Diagnosis    Family history of renal cell carcinoma    Acne     Past Surgical History:   Procedure Laterality Date    COSMETIC MAMMOPLASTY AUGMENTATION  10/2011    ZC NONSPECIFIC PROCEDURE  08/26/2005    Oral surgery      Social History     Tobacco Use    Smoking status: Never    Smokeless tobacco: Never   Substance Use Topics    Alcohol use: Not Currently     Comment: socially      Problem (# of Occurrences) Relation (Name,Age of Onset)    Autoimmune Disease (1) Sister (Irene)    Diabetes (1) Paternal Grandmother    Hypertension (1) Father    Other Cancer (1) Mother (Jacqueline Colón): RCC    Kidney Cancer (1) Mother (Jacqueline Colón)           Negative family history of: Skin Cancer, Melanoma              Current Outpatient Medications   Medication Sig Dispense Refill    clindamycin (CLEOCIN-T) 1 % external gel Apply twice daily as needed for flares of the acne 60 g 11    mometasone (ELOCON) 0.1 % external ointment Apply topically 2 times daily 45 g 3    norgestim-eth estrad triphasic (ORTHO TRI-CYCLEN) 0.18/0.215/0.25 MG-35 MCG tablet Take 1 tablet by mouth daily. Active tablets only, skip placebo pills. Take continuously. No off week. 112 tablet 4    spironolactone (ALDACTONE) 25 MG tablet Take 1 tablet (25 mg) by mouth 2 times daily 180 tablet 3    tretinoin (RETIN-A) 0.05 % external cream Apply topically at bedtime 20 g 11     No current facility-administered medications for this visit.     Allergies   Allergen Reactions    Penicillins     Promethazine      extra-pyramidal sx's       Past medical, surgical, social and family histories were reviewed and updated in [facility].    EXAM:  /80   Ht 1.664 m (5' 5.5\")   Wt 79.8 kg (176 lb)   LMP 07/29/2024 (Exact Date)   Breastfeeding No "   BMI 28.84 kg/m     BMI: Body mass index is 28.84 kg/m .    PHYSICAL EXAM:  Constitutional:   Appearance: Well nourished, well developed, alert, in no acute distress  Breasts: Inspection of Breasts:  No lymphadenopathy present., Palpation of Breasts and Axillae:  No masses present on palpation, no breast tenderness., Axillary Lymph Nodes:  No lymphadenopathy present., No nodularity, asymmetry or nipple discharge bilaterally., and bilateral implants in place  Lymphatic: Lymph Nodes:  No other lymphadenopathy present  Skin:  General Inspection:  No rashes present, no lesions present, no areas of  discoloration  Neurologic:    Mental Status:  Oriented X3.  Normal strength and tone, sensory exam                grossly normal, mentation intact and speech normal.    Psychiatric:   Mentation appears normal and affect normal/bright.         Pelvic Exam:  External Genitalia:     Normal appearance for age, no discharge present, no tenderness present, no inflammatory lesions present, color normal  Vagina:     Normal vaginal vault without central or paravaginal defects, no discharge present, no inflammatory lesions present, no masses present  Bladder:     Nontender to palpation  Urethra:   Urethral Body:  Urethra palpation normal, urethra structural support normal   Urethral Meatus:  No erythema or lesions present  Cervix:     Appearance healthy, no lesions present, nontender to palpation, no bleeding present  Uterus:     Uterus: firm, normal sized and nontender, anteverted in position.   Adnexa:     No adnexal tenderness present, no adnexal masses present  Perineum:     Perineum within normal limits, no evidence of trauma, no rashes or skin lesions present  Anus:     Anus within normal limits, no hemorrhoids present  Inguinal Lymph Nodes:     No lymphadenopathy present  Pubic Hair:     Normal pubic hair distribution for age  Genitalia and Groin:     No rashes present, no lesions present, no areas of discoloration, no masses  present    COUNSELING:   Special attention given to:        Regular exercise       Healthy diet/nutrition       Contraception       (Tiera)menopause management    BMI: Body mass index is 28.84 kg/m .      ASSESSMENT:  39 year old female with satisfactory annual exam.    ICD-10-CM    1. Encounter for gynecological examination without abnormal finding  Z01.419 HPV and Gynecologic Cytology Panel - Recommended Age 30-65 Years     MT PELVIC EXAMINATION      2. Encounter for surveillance of contraceptive pills  Z30.41 norgestim-eth estrad triphasic (ORTHO TRI-CYCLEN) 0.18/0.215/0.25 MG-35 MCG tablet      3. High priority for 2019-nCoV vaccine  Z23       4. Screen for STD (sexually transmitted disease)  Z11.3 NEISSERIA GONORRHOEA PCR     CHLAMYDIA TRACHOMATIS PCR     HIV Antigen Antibody Combo Cascade     Treponema Abs w Reflex to RPR and Titer     HIV Antigen Antibody Combo Cascade     Treponema Abs w Reflex to RPR and Titer          PLAN:  39-year-old female with a normal GYN exam.  Pap smear was updated and she can repeat in 3 years.  STD testing was completed today.  Vaccines were updated.  She is okay to continue on her oral contraceptive tablets for 1 year.  Perimenopause was discussed at length with her today.    STEVIE Perez CNP

## 2024-10-08 ENCOUNTER — OFFICE VISIT (OUTPATIENT)
Dept: OBGYN | Facility: CLINIC | Age: 39
End: 2024-10-08
Payer: COMMERCIAL

## 2024-10-08 VITALS
DIASTOLIC BLOOD PRESSURE: 80 MMHG | HEIGHT: 66 IN | SYSTOLIC BLOOD PRESSURE: 116 MMHG | BODY MASS INDEX: 28.28 KG/M2 | WEIGHT: 176 LBS

## 2024-10-08 DIAGNOSIS — Z11.3 SCREEN FOR STD (SEXUALLY TRANSMITTED DISEASE): ICD-10-CM

## 2024-10-08 DIAGNOSIS — Z01.419 ENCOUNTER FOR GYNECOLOGICAL EXAMINATION WITHOUT ABNORMAL FINDING: Primary | ICD-10-CM

## 2024-10-08 DIAGNOSIS — Z23 HIGH PRIORITY FOR 2019-NCOV VACCINE: ICD-10-CM

## 2024-10-08 DIAGNOSIS — Z30.41 ENCOUNTER FOR SURVEILLANCE OF CONTRACEPTIVE PILLS: ICD-10-CM

## 2024-10-08 LAB — T PALLIDUM AB SER QL: NONREACTIVE

## 2024-10-08 PROCEDURE — 87491 CHLMYD TRACH DNA AMP PROBE: CPT | Performed by: NURSE PRACTITIONER

## 2024-10-08 PROCEDURE — 99386 PREV VISIT NEW AGE 40-64: CPT | Mod: 25 | Performed by: NURSE PRACTITIONER

## 2024-10-08 PROCEDURE — 87624 HPV HI-RISK TYP POOLED RSLT: CPT | Performed by: NURSE PRACTITIONER

## 2024-10-08 PROCEDURE — 87591 N.GONORRHOEAE DNA AMP PROB: CPT | Performed by: NURSE PRACTITIONER

## 2024-10-08 PROCEDURE — 90471 IMMUNIZATION ADMIN: CPT | Performed by: NURSE PRACTITIONER

## 2024-10-08 PROCEDURE — 36415 COLL VENOUS BLD VENIPUNCTURE: CPT | Performed by: NURSE PRACTITIONER

## 2024-10-08 PROCEDURE — 91320 SARSCV2 VAC 30MCG TRS-SUC IM: CPT | Performed by: NURSE PRACTITIONER

## 2024-10-08 PROCEDURE — 99459 PELVIC EXAMINATION: CPT | Performed by: NURSE PRACTITIONER

## 2024-10-08 PROCEDURE — 86780 TREPONEMA PALLIDUM: CPT | Performed by: NURSE PRACTITIONER

## 2024-10-08 PROCEDURE — 90651 9VHPV VACCINE 2/3 DOSE IM: CPT | Performed by: NURSE PRACTITIONER

## 2024-10-08 PROCEDURE — 87389 HIV-1 AG W/HIV-1&-2 AB AG IA: CPT | Performed by: NURSE PRACTITIONER

## 2024-10-08 PROCEDURE — G0145 SCR C/V CYTO,THINLAYER,RESCR: HCPCS | Performed by: NURSE PRACTITIONER

## 2024-10-08 PROCEDURE — 90480 ADMN SARSCOV2 VAC 1/ONLY CMP: CPT | Performed by: NURSE PRACTITIONER

## 2024-10-08 RX ORDER — NORGESTIMATE AND ETHINYL ESTRADIOL 7DAYSX3 28
1 KIT ORAL DAILY
Qty: 112 TABLET | Refills: 4 | Status: SHIPPED | OUTPATIENT
Start: 2024-10-08

## 2024-10-09 LAB
C TRACH DNA SPEC QL NAA+PROBE: NEGATIVE
HIV 1+2 AB+HIV1 P24 AG SERPL QL IA: NONREACTIVE
HPV HR 12 DNA CVX QL NAA+PROBE: NEGATIVE
HPV16 DNA CVX QL NAA+PROBE: NEGATIVE
HPV18 DNA CVX QL NAA+PROBE: NEGATIVE
HUMAN PAPILLOMA VIRUS FINAL DIAGNOSIS: NORMAL
N GONORRHOEA DNA SPEC QL NAA+PROBE: NEGATIVE

## 2024-10-11 LAB
BKR AP ASSOCIATED HPV REPORT: NORMAL
BKR LAB AP GYN ADEQUACY: NORMAL
BKR LAB AP GYN INTERPRETATION: NORMAL
BKR LAB AP LMP: NORMAL
BKR LAB AP PREVIOUS ABNORMAL: NORMAL
PATH REPORT.COMMENTS IMP SPEC: NORMAL
PATH REPORT.COMMENTS IMP SPEC: NORMAL
PATH REPORT.RELEVANT HX SPEC: NORMAL

## 2024-11-06 NOTE — TELEPHONE ENCOUNTER
DIAGNOSIS: Bilateral shoulder pain, greater on the right    APPOINTMENT DATE: 11/18/24   NOTES STATUS DETAILS   OFFICE NOTE from referring provider Internal Self    MEDICATION LIST Internal

## 2024-11-18 ENCOUNTER — PRE VISIT (OUTPATIENT)
Dept: ORTHOPEDICS | Facility: CLINIC | Age: 39
End: 2024-11-18

## 2025-03-24 NOTE — TELEPHONE ENCOUNTER
DIAGNOSIS: Left hip/iliac crest pain    APPOINTMENT DATE: 3/27/25   NOTES STATUS DETAILS   OFFICE NOTE from referring provider N/A Self Referral    MEDICATION LIST Internal

## 2025-03-26 NOTE — TELEPHONE ENCOUNTER
DIAGNOSIS: Pain under big toe, medial aspect of right foot.     APPOINTMENT DATE: 3.27.25   NOTES STATUS DETAILS   MEDICATION LIST Internal

## 2025-03-27 ENCOUNTER — PRE VISIT (OUTPATIENT)
Dept: ORTHOPEDICS | Facility: CLINIC | Age: 40
End: 2025-03-27

## 2025-03-27 ENCOUNTER — ANCILLARY PROCEDURE (OUTPATIENT)
Dept: GENERAL RADIOLOGY | Facility: CLINIC | Age: 40
End: 2025-03-27
Attending: FAMILY MEDICINE
Payer: COMMERCIAL

## 2025-03-27 ENCOUNTER — OFFICE VISIT (OUTPATIENT)
Dept: ORTHOPEDICS | Facility: CLINIC | Age: 40
End: 2025-03-27
Payer: COMMERCIAL

## 2025-03-27 DIAGNOSIS — M25.871 SESAMOIDITIS OF RIGHT FOOT: Primary | ICD-10-CM

## 2025-03-27 DIAGNOSIS — M79.674 PAIN OF RIGHT GREAT TOE: ICD-10-CM

## 2025-03-27 DIAGNOSIS — M79.674 PAIN OF RIGHT GREAT TOE: Primary | ICD-10-CM

## 2025-03-27 NOTE — PROGRESS NOTES
Sports Medicine Clinic Visit    PCP: Mary Nelson Rabia Nino is a 39 year old female who is seen  as self referral presenting with Right medial great toe pain    She has added a supine leg press with a terminal toe raise to her workout program recently and thinks this may have been the source of the discomfort.  She is not involved in other aggressive great toe specific athletic activities.    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    Right great toe discomfort, volar surface x one month                  XR Foot 3+ Views/Ankle 2 Views Series Lt 9/5/2021 [facility]  Impression    COMPARISON:  None.    FINDINGS:  No acute osseous abnormality or malalignment throughout the left foot or ankle. The ankle mortise is symmetric. The talar dome and tibial plafond are intact.      Patient has worked as an ultrasound tech at the [employer]        PMH:  Past Medical History:   Diagnosis Date    Family history of renal cell carcinoma 1/5/2017    Uterine inversion     first pregnancy       Active problem list:  Patient Active Problem List   Diagnosis    Family history of renal cell carcinoma    Acne       FH:  Family History   Problem Relation Age of Onset    Kidney Cancer Mother     Other Cancer Mother         RCC    Hypertension Father     Diabetes Paternal Grandmother     Autoimmune Disease Sister     Skin Cancer No family hx of     Melanoma No family hx of        SH:  Social History     Socioeconomic History    Marital status:      Spouse name: Farooq    Number of  children: 2    Years of education: Not on file    Highest education level: Not on file   Occupational History    Occupation: [employer]   Tobacco Use    Smoking status: Never    Smokeless tobacco: Never   Substance and Sexual Activity    Alcohol use: Not Currently     Comment: socially    Drug use: No    Sexual activity: Yes     Partners: Male     Birth control/protection: Pill   Other Topics Concern     Service Not Asked    Blood Transfusions No    Caffeine Concern Not Asked    Occupational Exposure Not Asked    Hobby Hazards Not Asked    Sleep Concern Not Asked    Stress Concern Not Asked    Weight Concern Not Asked    Special Diet Not Asked    Back Care Not Asked    Exercise Yes    Bike Helmet Not Asked    Seat Belt Yes    Self-Exams No    Parent/sibling w/ CABG, MI or angioplasty before 65F 55M? No   Social History Narrative    Living arrangements - the patient lives with her family.      Social Drivers of Health     Financial Resource Strain: Low Risk  (1/10/2024)    Financial Resource Strain     Within the past 12 months, have you or your family members you live with been unable to get utilities (heat, electricity) when it was really needed?: No   Food Insecurity: Low Risk  (1/10/2024)    Food Insecurity     Within the past 12 months, did you worry that your food would run out before you got money to buy more?: No     Within the past 12 months, did the food you bought just not last and you didn t have money to get more?: No   Transportation Needs: Low Risk  (1/10/2024)    Transportation Needs     Within the past 12 months, has lack of transportation kept you from medical appointments, getting your medicines, non-medical meetings or appointments, work, or from getting things that you need?: No   Physical Activity: Not on file   Stress: Not on file   Social Connections: Not on file   Interpersonal Safety: Low Risk  (1/10/2024)    Interpersonal Safety     Do you feel physically and emotionally safe  where you currently live?: Yes     Within the past 12 months, have you been hit, slapped, kicked or otherwise physically hurt by someone?: No     Within the past 12 months, have you been humiliated or emotionally abused in other ways by your partner or ex-partner?: No   Housing Stability: Low Risk  (1/10/2024)    Housing Stability     Do you have housing? : Yes     Are you worried about losing your housing?: No       MEDS:  See EMR, reviewed  ALL:  See EMR, reviewed    REVIEW OF SYSTEMS:  CONSTITUTIONAL:NEGATIVE for fever, chills, change in weight  INTEGUMENTARY/SKIN: NEGATIVE for worrisome rashes, moles or lesions  EYES: NEGATIVE for vision changes or irritation  ENT/MOUTH: NEGATIVE for ear, mouth and throat problems  RESP:NEGATIVE for significant cough or SOB  BREAST: NEGATIVE for masses, tenderness or discharge  CV: NEGATIVE for chest pain, palpitations or peripheral edema  GI: NEGATIVE for nausea, abdominal pain, heartburn, or change in bowel habits  :NEGATIVE for frequency, dysuria, or hematuria  :NEGATIVE for frequency, dysuria, or hematuria  NEURO: NEGATIVE for weakness, dizziness or paresthesias  ENDOCRINE: NEGATIVE for temperature intolerance, skin/hair changes  HEME/ALLERGY/IMMUNE: NEGATIVE for bleeding problems  PSYCHIATRIC: NEGATIVE for changes in mood or affect      Objective: She points to the volar base of the right MTP joint of the great toe as the area of discomfort.  There is full passive range of motion with no discomfort over the dorsal joint.  She is tender over the tibial sesamoid but nontender over the fibular sesamoid.  No joint effusion.  Overlying skin is normal.  Neutral heel with neutral forefoot.  Skin is intact.  Appropriate conversation and affect.    I personally agree with the patient x-rays including a normal appearing MTP joint with no significant degenerative change and normal-appearing sesamoid joints.    Assessment: Sesamoiditis, right    Plan: We discussed options she can  make for changes in her workout program to avoid aggravating the great toe.  She has a cam boot available at home that she could use for 10 days, until no longer tender over the sesamoid.  We also discussed alternative options such as a firm soled shoe, or an over-the-counter half steel toe insert.  She plans on optimizing these conservative cares and will follow-up if not improved.

## 2025-03-27 NOTE — LETTER
3/27/2025      RE: Shellie Nino  [address]  [place] [address]     Dear Colleague,    Thank you for referring your patient, Shellie Nino, to the [facility] [place]. Please see a copy of my visit note below.    Sports Medicine Clinic Visit    PCP: Mary Nelson    Shellie Nino is a 39 year old female who is seen  as self referral presenting with Right medial great toe pain    She has added a supine leg press with a terminal toe raise to her workout program recently and thinks this may have been the source of the discomfort.  She is not involved in other aggressive great toe specific athletic activities.    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    Right great toe discomfort, volar surface x one month                  XR Foot 3+ Views/Ankle 2 Views Series Lt 9/5/2021 [facility]  Impression    COMPARISON:  None.    FINDINGS:  No acute osseous abnormality or malalignment throughout the left foot or ankle. The ankle mortise is symmetric. The talar dome and tibial plafond are intact.      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        PMH:  Past Medical History:   Diagnosis Date     Family history of renal cell carcinoma 1/5/2017     Uterine inversion     first pregnancy       Active problem list:  Patient Active Problem List   Diagnosis     Family history of renal cell carcinoma     Acne       FH:  Family History   Problem Relation Age of Onset     Kidney Cancer Mother      Other Cancer Mother          RCC     Hypertension Father      Diabetes Paternal Grandmother      Autoimmune Disease Sister      Skin Cancer No family hx of      Melanoma No family hx of        SH:  Social History     Socioeconomic History     Marital status:      Spouse name: Farooq     Number of children: 2     Years of education: Not on file     Highest education level: Not on file   Occupational History     Occupation: [employer]   Tobacco Use     Smoking status: Never     Smokeless tobacco: Never   Substance and Sexual Activity     Alcohol use: Not Currently     Comment: socially     Drug use: No     Sexual activity: Yes     Partners: Male     Birth control/protection: Pill   Other Topics Concern      Service Not Asked     Blood Transfusions No     Caffeine Concern Not Asked     Occupational Exposure Not Asked     Hobby Hazards Not Asked     Sleep Concern Not Asked     Stress Concern Not Asked     Weight Concern Not Asked     Special Diet Not Asked     Back Care Not Asked     Exercise Yes     Bike Helmet Not Asked     Seat Belt Yes     Self-Exams No     Parent/sibling w/ CABG, MI or angioplasty before 65F 55M? No   Social History Narrative    Living arrangements - the patient lives with her family.      Social Drivers of Health     Financial Resource Strain: Low Risk  (1/10/2024)    Financial Resource Strain      Within the past 12 months, have you or your family members you live with been unable to get utilities (heat, electricity) when it was really needed?: No   Food Insecurity: Low Risk  (1/10/2024)    Food Insecurity      Within the past 12 months, did you worry that your food would run out before you got money to buy more?: No      Within the past 12 months, did the food you bought just not last and you didn t have money to get more?: No   Transportation Needs: Low Risk  (1/10/2024)    Transportation Needs      Within the past 12 months, has lack of transportation kept you from medical appointments, getting  your medicines, non-medical meetings or appointments, work, or from getting things that you need?: No   Physical Activity: Not on file   Stress: Not on file   Social Connections: Not on file   Interpersonal Safety: Low Risk  (1/10/2024)    Interpersonal Safety      Do you feel physically and emotionally safe where you currently live?: Yes      Within the past 12 months, have you been hit, slapped, kicked or otherwise physically hurt by someone?: No      Within the past 12 months, have you been humiliated or emotionally abused in other ways by your partner or ex-partner?: No   Housing Stability: Low Risk  (1/10/2024)    Housing Stability      Do you have housing? : Yes      Are you worried about losing your housing?: No       MEDS:  See EMR, reviewed  ALL:  See EMR, reviewed    REVIEW OF SYSTEMS:  CONSTITUTIONAL:NEGATIVE for fever, chills, change in weight  INTEGUMENTARY/SKIN: NEGATIVE for worrisome rashes, moles or lesions  EYES: NEGATIVE for vision changes or irritation  ENT/MOUTH: NEGATIVE for ear, mouth and throat problems  RESP:NEGATIVE for significant cough or SOB  BREAST: NEGATIVE for masses, tenderness or discharge  CV: NEGATIVE for chest pain, palpitations or peripheral edema  GI: NEGATIVE for nausea, abdominal pain, heartburn, or change in bowel habits  :NEGATIVE for frequency, dysuria, or hematuria  :NEGATIVE for frequency, dysuria, or hematuria  NEURO: NEGATIVE for weakness, dizziness or paresthesias  ENDOCRINE: NEGATIVE for temperature intolerance, skin/hair changes  HEME/ALLERGY/IMMUNE: NEGATIVE for bleeding problems  PSYCHIATRIC: NEGATIVE for changes in mood or affect      Objective: She points to the volar base of the right MTP joint of the great toe as the area of discomfort.  There is full passive range of motion with no discomfort over the dorsal joint.  She is tender over the tibial sesamoid but nontender over the fibular sesamoid.  No joint effusion.  Overlying skin is normal.  Neutral heel  with neutral forefoot.  Skin is intact.  Appropriate conversation and affect.    I personally agree with the patient x-rays including a normal appearing MTP joint with no significant degenerative change and normal-appearing sesamoid joints.    Assessment: Sesamoiditis, right    Plan: We discussed options she can make for changes in her workout program to avoid aggravating the great toe.  She has a cam boot available at home that she could use for 10 days, until no longer tender over the sesamoid.  We also discussed alternative options such as a firm soled shoe, or an over-the-counter half steel toe insert.  She plans on optimizing these conservative cares and will follow-up if not improved.                        Again, thank you for allowing me to participate in the care of your patient.      Sincerely,    Farooq Beck MD